# Patient Record
Sex: FEMALE | Race: WHITE | Employment: UNEMPLOYED | URBAN - METROPOLITAN AREA
[De-identification: names, ages, dates, MRNs, and addresses within clinical notes are randomized per-mention and may not be internally consistent; named-entity substitution may affect disease eponyms.]

---

## 2022-09-08 LAB — MAMMOGRAPHY, EXTERNAL: NORMAL

## 2024-04-23 ENCOUNTER — HOSPITAL ENCOUNTER (INPATIENT)
Facility: HOSPITAL | Age: 74
LOS: 2 days | Discharge: HOME | End: 2024-04-25
Attending: EMERGENCY MEDICINE | Admitting: STUDENT IN AN ORGANIZED HEALTH CARE EDUCATION/TRAINING PROGRAM
Payer: COMMERCIAL

## 2024-04-23 ENCOUNTER — APPOINTMENT (OUTPATIENT)
Facility: HOSPITAL | Age: 74
End: 2024-04-23
Attending: EMERGENCY MEDICINE
Payer: COMMERCIAL

## 2024-04-23 ENCOUNTER — APPOINTMENT (OUTPATIENT)
Facility: HOSPITAL | Age: 74
End: 2024-04-23
Payer: COMMERCIAL

## 2024-04-23 DIAGNOSIS — I82.4Y2 ACUTE DEEP VEIN THROMBOSIS (DVT) OF PROXIMAL VEIN OF LEFT LOWER EXTREMITY (HCC): ICD-10-CM

## 2024-04-23 DIAGNOSIS — I26.92 ACUTE SADDLE PULMONARY EMBOLISM, UNSPECIFIED WHETHER ACUTE COR PULMONALE PRESENT (HCC): Primary | ICD-10-CM

## 2024-04-23 PROBLEM — I26.02 ACUTE SADDLE PULMONARY EMBOLISM WITH ACUTE COR PULMONALE (HCC): Status: ACTIVE | Noted: 2024-04-23

## 2024-04-23 LAB
ABO + RH BLD: NORMAL
ACT BLD: 196 SEC (ref 74–125)
ALBUMIN SERPL-MCNC: 4 G/DL (ref 3.5–5)
ALBUMIN/GLOB SERPL: 0.9 (ref 1.1–2.2)
ALP SERPL-CCNC: 102 U/L (ref 45–117)
ALT SERPL-CCNC: ABNORMAL U/L (ref 12–78)
ANION GAP SERPL CALC-SCNC: 4 MMOL/L (ref 5–15)
APPEARANCE UR: CLEAR
APTT PPP: 22.6 SEC (ref 21.2–34.1)
AST SERPL W P-5'-P-CCNC: ABNORMAL U/L (ref 15–37)
BACTERIA URNS QL MICRO: NEGATIVE /HPF
BASOPHILS # BLD: 0 K/UL (ref 0–0.1)
BASOPHILS NFR BLD: 0 % (ref 0–1)
BILIRUB SERPL-MCNC: 0.4 MG/DL (ref 0.2–1)
BILIRUB UR QL: NEGATIVE
BLOOD GROUP ANTIBODIES SERPL: NEGATIVE
BNP SERPL-MCNC: 155 PG/ML
BUN SERPL-MCNC: 17 MG/DL (ref 6–20)
BUN/CREAT SERPL: 16 (ref 12–20)
CA-I BLD-MCNC: 9.3 MG/DL (ref 8.5–10.1)
CHLORIDE SERPL-SCNC: 102 MMOL/L (ref 97–108)
CO2 SERPL-SCNC: 26 MMOL/L (ref 21–32)
COLOR UR: ABNORMAL
CREAT SERPL-MCNC: 1.04 MG/DL (ref 0.55–1.02)
D DIMER PPP FEU-MCNC: 9.49 UG/ML(FEU)
DIFFERENTIAL METHOD BLD: ABNORMAL
ECHO AO ASC DIAM: 2.5 CM
ECHO AO ASCENDING AORTA INDEX: 1.53 CM/M2
ECHO AO ROOT DIAM: 2.3 CM
ECHO AO ROOT INDEX: 1.41 CM/M2
ECHO AV AREA PEAK VELOCITY: 1.3 CM2
ECHO AV AREA/BSA PEAK VELOCITY: 0.8 CM2/M2
ECHO AV PEAK GRADIENT: 13 MMHG
ECHO AV PEAK VELOCITY: 1.8 M/S
ECHO AV VELOCITY RATIO: 0.94
ECHO BSA: 1.68 M2
ECHO BSA: 1.68 M2
ECHO EST RA PRESSURE: 3 MMHG
ECHO IVC EXP: 1.8 CM
ECHO LA AREA 2C: 11.9 CM2
ECHO LA AREA 4C: 10.8 CM2
ECHO LA DIAMETER INDEX: 1.96 CM/M2
ECHO LA DIAMETER: 3.2 CM
ECHO LA MAJOR AXIS: 4.9 CM
ECHO LA MINOR AXIS: 5.2 CM
ECHO LA TO AORTIC ROOT RATIO: 1.39
ECHO LA VOL BP: 22 ML (ref 22–52)
ECHO LA VOL MOD A2C: 23 ML (ref 22–52)
ECHO LA VOL MOD A4C: 20 ML (ref 22–52)
ECHO LA VOL/BSA BIPLANE: 13 ML/M2 (ref 16–34)
ECHO LA VOLUME INDEX MOD A2C: 14 ML/M2 (ref 16–34)
ECHO LA VOLUME INDEX MOD A4C: 12 ML/M2 (ref 16–34)
ECHO LV E' LATERAL VELOCITY: 5 CM/S
ECHO LV E' SEPTAL VELOCITY: 9 CM/S
ECHO LV EDV A2C: 46 ML
ECHO LV EDV NDEX A2C: 28 ML/M2
ECHO LV EJECTION FRACTION A2C: 69 %
ECHO LV ESV A2C: 14 ML
ECHO LV ESV INDEX A2C: 9 ML/M2
ECHO LV FRACTIONAL SHORTENING: 40 % (ref 28–44)
ECHO LV INTERNAL DIMENSION DIASTOLE INDEX: 1.53 CM/M2
ECHO LV INTERNAL DIMENSION DIASTOLIC: 2.5 CM (ref 3.9–5.3)
ECHO LV INTERNAL DIMENSION SYSTOLIC INDEX: 0.92 CM/M2
ECHO LV INTERNAL DIMENSION SYSTOLIC: 1.5 CM
ECHO LV IVSD: 1.5 CM (ref 0.6–0.9)
ECHO LV MASS 2D: 104.6 G (ref 67–162)
ECHO LV MASS INDEX 2D: 64.2 G/M2 (ref 43–95)
ECHO LV POSTERIOR WALL DIASTOLIC: 1.2 CM (ref 0.6–0.9)
ECHO LV RELATIVE WALL THICKNESS RATIO: 0.96
ECHO LVOT AREA: 1.3 CM2
ECHO LVOT DIAM: 1.3 CM
ECHO LVOT PEAK GRADIENT: 12 MMHG
ECHO LVOT PEAK VELOCITY: 1.7 M/S
ECHO MV A VELOCITY: 0.49 M/S
ECHO MV E DECELERATION TIME (DT): 115 MS
ECHO MV E VELOCITY: 0.75 M/S
ECHO MV E/A RATIO: 1.53
ECHO MV E/E' LATERAL: 15
ECHO MV E/E' RATIO (AVERAGED): 11.67
ECHO PV ACCELERATION TIME (AT): 53 MS
ECHO PV MAX VELOCITY: 1 M/S
ECHO PV PEAK GRADIENT: 4 MMHG
ECHO RA AREA 4C: 12.2 CM2
ECHO RA END SYSTOLIC VOLUME APICAL 4 CHAMBER INDEX BSA: 17 ML/M2
ECHO RA VOLUME: 27 ML
ECHO RIGHT VENTRICULAR SYSTOLIC PRESSURE (RVSP): 32 MMHG
ECHO RV BASAL DIMENSION: 3.4 CM
ECHO TV REGURGITANT MAX VELOCITY: 2.71 M/S
ECHO TV REGURGITANT PEAK GRADIENT: 29 MMHG
EKG ATRIAL RATE: 114 BPM
EKG DIAGNOSIS: NORMAL
EKG P AXIS: 72 DEGREES
EKG P-R INTERVAL: 204 MS
EKG Q-T INTERVAL: 292 MS
EKG QRS DURATION: 74 MS
EKG QTC CALCULATION (BAZETT): 402 MS
EKG R AXIS: -35 DEGREES
EKG T AXIS: 87 DEGREES
EKG VENTRICULAR RATE: 114 BPM
EOSINOPHIL # BLD: 0.1 K/UL (ref 0–0.4)
EOSINOPHIL NFR BLD: 1 % (ref 0–7)
EPITH CASTS URNS QL MICRO: ABNORMAL /LPF
ERYTHROCYTE [DISTWIDTH] IN BLOOD BY AUTOMATED COUNT: 13 % (ref 11.5–14.5)
ERYTHROCYTE [DISTWIDTH] IN BLOOD BY AUTOMATED COUNT: 13.1 % (ref 11.5–14.5)
FLUAV AG NPH QL IA: NEGATIVE
FLUBV AG NOSE QL IA: NEGATIVE
GLOBULIN SER CALC-MCNC: 4.5 G/DL (ref 2–4)
GLUCOSE BLD STRIP.AUTO-MCNC: 166 MG/DL (ref 65–100)
GLUCOSE SERPL-MCNC: 186 MG/DL (ref 65–100)
GLUCOSE UR STRIP.AUTO-MCNC: 50 MG/DL
HCT VFR BLD AUTO: 42.8 % (ref 35–47)
HCT VFR BLD AUTO: 47.2 % (ref 35–47)
HGB BLD-MCNC: 14 G/DL (ref 11.5–16)
HGB BLD-MCNC: 15.3 G/DL (ref 11.5–16)
HGB UR QL STRIP: ABNORMAL
IMM GRANULOCYTES # BLD AUTO: 0.1 K/UL (ref 0–0.04)
IMM GRANULOCYTES NFR BLD AUTO: 1 % (ref 0–0.5)
INR PPP: 1.2 (ref 0.9–1.1)
KETONES UR QL STRIP.AUTO: NEGATIVE MG/DL
LACTATE BLD-SCNC: 2.83 MMOL/L (ref 0.4–2)
LACTATE BLD-SCNC: 4.59 MMOL/L (ref 0.4–2)
LACTATE SERPL-SCNC: 3.2 MMOL/L (ref 0.4–2)
LACTATE SERPL-SCNC: 3.5 MMOL/L (ref 0.4–2)
LEUKOCYTE ESTERASE UR QL STRIP.AUTO: ABNORMAL
LYMPHOCYTES # BLD: 2.1 K/UL (ref 0.8–3.5)
LYMPHOCYTES NFR BLD: 14 % (ref 12–49)
MCH RBC QN AUTO: 30.5 PG (ref 26–34)
MCH RBC QN AUTO: 30.7 PG (ref 26–34)
MCHC RBC AUTO-ENTMCNC: 32.4 G/DL (ref 30–36.5)
MCHC RBC AUTO-ENTMCNC: 32.7 G/DL (ref 30–36.5)
MCV RBC AUTO: 93.9 FL (ref 80–99)
MCV RBC AUTO: 94 FL (ref 80–99)
MONOCYTES # BLD: 1.1 K/UL (ref 0–1)
MONOCYTES NFR BLD: 7 % (ref 5–13)
MRSA DNA SPEC QL NAA+PROBE: DETECTED
MUCOUS THREADS URNS QL MICRO: NEGATIVE /LPF
NEUTS SEG # BLD: 11.7 K/UL (ref 1.8–8)
NEUTS SEG NFR BLD: 77 % (ref 32–75)
NITRITE UR QL STRIP.AUTO: NEGATIVE
NRBC # BLD: 0 K/UL (ref 0–0.01)
NRBC # BLD: 0 K/UL (ref 0–0.01)
NRBC BLD-RTO: 0 PER 100 WBC
NRBC BLD-RTO: 0 PER 100 WBC
PERFORMED BY:: ABNORMAL
PH UR STRIP: 5 (ref 5–8)
PLATELET # BLD AUTO: 160 K/UL (ref 150–400)
PLATELET # BLD AUTO: 164 K/UL (ref 150–400)
PMV BLD AUTO: 10.8 FL (ref 8.9–12.9)
PMV BLD AUTO: 11 FL (ref 8.9–12.9)
POTASSIUM SERPL-SCNC: 4.7 MMOL/L (ref 3.5–5.1)
POTASSIUM SERPL-SCNC: ABNORMAL MMOL/L (ref 3.5–5.1)
PROCALCITONIN SERPL-MCNC: <0.05 NG/ML
PROT SERPL-MCNC: 8.5 G/DL (ref 6.4–8.2)
PROT UR STRIP-MCNC: NEGATIVE MG/DL
PROTHROMBIN TIME: 15.4 SEC (ref 11.9–14.6)
RBC # BLD AUTO: 4.56 M/UL (ref 3.8–5.2)
RBC # BLD AUTO: 5.02 M/UL (ref 3.8–5.2)
RBC #/AREA URNS HPF: ABNORMAL /HPF (ref 0–5)
SARS-COV-2 RDRP RESP QL NAA+PROBE: NOT DETECTED
SODIUM SERPL-SCNC: 132 MMOL/L (ref 136–145)
SP GR UR REFRACTOMETRY: >1.03 (ref 1–1.03)
SPECIMEN EXP DATE BLD: NORMAL
THERAPEUTIC RANGE: NORMAL SEC (ref 82–109)
TROPONIN I SERPL HS-MCNC: 1264 NG/L (ref 0–51)
TROPONIN I SERPL HS-MCNC: 1304 NG/L (ref 0–51)
UFH PPP CHRO-ACNC: >1.1 IU/ML
UFH PPP CHRO-ACNC: >1.1 IU/ML
URINE CULTURE IF INDICATED: ABNORMAL
UROBILINOGEN UR QL STRIP.AUTO: 0.1 EU/DL (ref 0.1–1)
WBC # BLD AUTO: 11.3 K/UL (ref 3.6–11)
WBC # BLD AUTO: 15.1 K/UL (ref 3.6–11)
WBC URNS QL MICRO: ABNORMAL /HPF (ref 0–4)

## 2024-04-23 PROCEDURE — 71275 CT ANGIOGRAPHY CHEST: CPT

## 2024-04-23 PROCEDURE — 86900 BLOOD TYPING SEROLOGIC ABO: CPT

## 2024-04-23 PROCEDURE — 96374 THER/PROPH/DIAG INJ IV PUSH: CPT

## 2024-04-23 PROCEDURE — 85347 COAGULATION TIME ACTIVATED: CPT

## 2024-04-23 PROCEDURE — 6360000002 HC RX W HCPCS: Performed by: STUDENT IN AN ORGANIZED HEALTH CARE EDUCATION/TRAINING PROGRAM

## 2024-04-23 PROCEDURE — 99285 EMERGENCY DEPT VISIT HI MDM: CPT

## 2024-04-23 PROCEDURE — 71045 X-RAY EXAM CHEST 1 VIEW: CPT

## 2024-04-23 PROCEDURE — 2700000000 HC OXYGEN THERAPY PER DAY

## 2024-04-23 PROCEDURE — 6360000002 HC RX W HCPCS: Performed by: EMERGENCY MEDICINE

## 2024-04-23 PROCEDURE — 82962 GLUCOSE BLOOD TEST: CPT

## 2024-04-23 PROCEDURE — 6360000004 HC RX CONTRAST MEDICATION: Performed by: EMERGENCY MEDICINE

## 2024-04-23 PROCEDURE — 2000000000 HC ICU R&B

## 2024-04-23 PROCEDURE — 84484 ASSAY OF TROPONIN QUANT: CPT

## 2024-04-23 PROCEDURE — 02CR3ZZ EXTIRPATION OF MATTER FROM LEFT PULMONARY ARTERY, PERCUTANEOUS APPROACH: ICD-10-PCS | Performed by: STUDENT IN AN ORGANIZED HEALTH CARE EDUCATION/TRAINING PROGRAM

## 2024-04-23 PROCEDURE — 87635 SARS-COV-2 COVID-19 AMP PRB: CPT

## 2024-04-23 PROCEDURE — 2580000003 HC RX 258: Performed by: STUDENT IN AN ORGANIZED HEALTH CARE EDUCATION/TRAINING PROGRAM

## 2024-04-23 PROCEDURE — 84145 PROCALCITONIN (PCT): CPT

## 2024-04-23 PROCEDURE — 2580000003 HC RX 258: Performed by: EMERGENCY MEDICINE

## 2024-04-23 PROCEDURE — 85379 FIBRIN DEGRADATION QUANT: CPT

## 2024-04-23 PROCEDURE — 85025 COMPLETE CBC W/AUTO DIFF WBC: CPT

## 2024-04-23 PROCEDURE — 85730 THROMBOPLASTIN TIME PARTIAL: CPT

## 2024-04-23 PROCEDURE — 85610 PROTHROMBIN TIME: CPT

## 2024-04-23 PROCEDURE — 83880 ASSAY OF NATRIURETIC PEPTIDE: CPT

## 2024-04-23 PROCEDURE — 84132 ASSAY OF SERUM POTASSIUM: CPT

## 2024-04-23 PROCEDURE — 6360000004 HC RX CONTRAST MEDICATION: Performed by: STUDENT IN AN ORGANIZED HEALTH CARE EDUCATION/TRAINING PROGRAM

## 2024-04-23 PROCEDURE — 87040 BLOOD CULTURE FOR BACTERIA: CPT

## 2024-04-23 PROCEDURE — 94761 N-INVAS EAR/PLS OXIMETRY MLT: CPT

## 2024-04-23 PROCEDURE — 2720000010 IR GUIDED THROMB MECH VEIN

## 2024-04-23 PROCEDURE — 85520 HEPARIN ASSAY: CPT

## 2024-04-23 PROCEDURE — 86901 BLOOD TYPING SEROLOGIC RH(D): CPT

## 2024-04-23 PROCEDURE — 93005 ELECTROCARDIOGRAM TRACING: CPT | Performed by: EMERGENCY MEDICINE

## 2024-04-23 PROCEDURE — 81001 URINALYSIS AUTO W/SCOPE: CPT

## 2024-04-23 PROCEDURE — 96375 TX/PRO/DX INJ NEW DRUG ADDON: CPT

## 2024-04-23 PROCEDURE — 80053 COMPREHEN METABOLIC PANEL: CPT

## 2024-04-23 PROCEDURE — 85027 COMPLETE CBC AUTOMATED: CPT

## 2024-04-23 PROCEDURE — 02CQ3ZZ EXTIRPATION OF MATTER FROM RIGHT PULMONARY ARTERY, PERCUTANEOUS APPROACH: ICD-10-PCS | Performed by: STUDENT IN AN ORGANIZED HEALTH CARE EDUCATION/TRAINING PROGRAM

## 2024-04-23 PROCEDURE — 86850 RBC ANTIBODY SCREEN: CPT

## 2024-04-23 PROCEDURE — 83605 ASSAY OF LACTIC ACID: CPT

## 2024-04-23 PROCEDURE — 87804 INFLUENZA ASSAY W/OPTIC: CPT

## 2024-04-23 PROCEDURE — 93971 EXTREMITY STUDY: CPT

## 2024-04-23 PROCEDURE — 87641 MR-STAPH DNA AMP PROBE: CPT

## 2024-04-23 PROCEDURE — 93306 TTE W/DOPPLER COMPLETE: CPT

## 2024-04-23 RX ORDER — ONDANSETRON 2 MG/ML
INJECTION INTRAMUSCULAR; INTRAVENOUS PRN
Status: COMPLETED | OUTPATIENT
Start: 2024-04-23 | End: 2024-04-23

## 2024-04-23 RX ORDER — SODIUM CHLORIDE 0.9 % (FLUSH) 0.9 %
5-40 SYRINGE (ML) INJECTION PRN
Status: DISCONTINUED | OUTPATIENT
Start: 2024-04-23 | End: 2024-04-25 | Stop reason: HOSPADM

## 2024-04-23 RX ORDER — ONDANSETRON 2 MG/ML
4 INJECTION INTRAMUSCULAR; INTRAVENOUS EVERY 6 HOURS PRN
Status: DISCONTINUED | OUTPATIENT
Start: 2024-04-23 | End: 2024-04-25 | Stop reason: HOSPADM

## 2024-04-23 RX ORDER — POLYETHYLENE GLYCOL 3350 17 G/17G
17 POWDER, FOR SOLUTION ORAL DAILY PRN
Status: DISCONTINUED | OUTPATIENT
Start: 2024-04-23 | End: 2024-04-25 | Stop reason: HOSPADM

## 2024-04-23 RX ORDER — HEPARIN SODIUM 1000 [USP'U]/ML
80 INJECTION, SOLUTION INTRAVENOUS; SUBCUTANEOUS PRN
Status: DISCONTINUED | OUTPATIENT
Start: 2024-04-23 | End: 2024-04-25

## 2024-04-23 RX ORDER — HEPARIN SODIUM 1000 [USP'U]/ML
80 INJECTION, SOLUTION INTRAVENOUS; SUBCUTANEOUS ONCE
Status: COMPLETED | OUTPATIENT
Start: 2024-04-23 | End: 2024-04-23

## 2024-04-23 RX ORDER — SODIUM CHLORIDE 0.9 % (FLUSH) 0.9 %
5-40 SYRINGE (ML) INJECTION EVERY 12 HOURS SCHEDULED
Status: DISCONTINUED | OUTPATIENT
Start: 2024-04-23 | End: 2024-04-25 | Stop reason: HOSPADM

## 2024-04-23 RX ORDER — HEPARIN SODIUM 10000 [USP'U]/100ML
5-30 INJECTION, SOLUTION INTRAVENOUS CONTINUOUS
Status: DISCONTINUED | OUTPATIENT
Start: 2024-04-23 | End: 2024-04-25

## 2024-04-23 RX ORDER — HEPARIN SODIUM 1000 [USP'U]/ML
40 INJECTION, SOLUTION INTRAVENOUS; SUBCUTANEOUS PRN
Status: DISCONTINUED | OUTPATIENT
Start: 2024-04-23 | End: 2024-04-25

## 2024-04-23 RX ORDER — ONDANSETRON 4 MG/1
4 TABLET, ORALLY DISINTEGRATING ORAL EVERY 8 HOURS PRN
Status: DISCONTINUED | OUTPATIENT
Start: 2024-04-23 | End: 2024-04-25 | Stop reason: HOSPADM

## 2024-04-23 RX ORDER — SODIUM CHLORIDE 9 MG/ML
INJECTION, SOLUTION INTRAVENOUS PRN
Status: DISCONTINUED | OUTPATIENT
Start: 2024-04-23 | End: 2024-04-25 | Stop reason: HOSPADM

## 2024-04-23 RX ORDER — 0.9 % SODIUM CHLORIDE 0.9 %
30 INTRAVENOUS SOLUTION INTRAVENOUS ONCE
Status: COMPLETED | OUTPATIENT
Start: 2024-04-23 | End: 2024-04-23

## 2024-04-23 RX ORDER — FENTANYL CITRATE 50 UG/ML
INJECTION, SOLUTION INTRAMUSCULAR; INTRAVENOUS PRN
Status: COMPLETED | OUTPATIENT
Start: 2024-04-23 | End: 2024-04-23

## 2024-04-23 RX ORDER — ACETAMINOPHEN 325 MG/1
650 TABLET ORAL EVERY 6 HOURS PRN
Status: DISCONTINUED | OUTPATIENT
Start: 2024-04-23 | End: 2024-04-25 | Stop reason: HOSPADM

## 2024-04-23 RX ORDER — ACETAMINOPHEN 650 MG/1
650 SUPPOSITORY RECTAL EVERY 6 HOURS PRN
Status: DISCONTINUED | OUTPATIENT
Start: 2024-04-23 | End: 2024-04-25 | Stop reason: HOSPADM

## 2024-04-23 RX ORDER — ONDANSETRON 2 MG/ML
4 INJECTION INTRAMUSCULAR; INTRAVENOUS ONCE
Status: COMPLETED | OUTPATIENT
Start: 2024-04-23 | End: 2024-04-23

## 2024-04-23 RX ORDER — HEPARIN SODIUM 1000 [USP'U]/ML
INJECTION, SOLUTION INTRAVENOUS; SUBCUTANEOUS PRN
Status: COMPLETED | OUTPATIENT
Start: 2024-04-23 | End: 2024-04-23

## 2024-04-23 RX ADMIN — HEPARIN SODIUM 18 UNITS/KG/HR: 10000 INJECTION, SOLUTION INTRAVENOUS at 09:03

## 2024-04-23 RX ADMIN — SODIUM CHLORIDE, PRESERVATIVE FREE 10 ML: 5 INJECTION INTRAVENOUS at 14:28

## 2024-04-23 RX ADMIN — HEPARIN SODIUM 2000 UNITS: 1000 INJECTION, SOLUTION INTRAVENOUS; SUBCUTANEOUS at 12:43

## 2024-04-23 RX ADMIN — FENTANYL CITRATE 50 MCG: 50 INJECTION, SOLUTION INTRAMUSCULAR; INTRAVENOUS at 12:23

## 2024-04-23 RX ADMIN — HEPARIN SODIUM 5400 UNITS: 1000 INJECTION INTRAVENOUS; SUBCUTANEOUS at 09:03

## 2024-04-23 RX ADMIN — SODIUM CHLORIDE, PRESERVATIVE FREE 10 ML: 5 INJECTION INTRAVENOUS at 21:15

## 2024-04-23 RX ADMIN — ONDANSETRON 4 MG: 2 INJECTION INTRAMUSCULAR; INTRAVENOUS at 12:40

## 2024-04-23 RX ADMIN — IOPAMIDOL 100 ML: 755 INJECTION, SOLUTION INTRAVENOUS at 09:39

## 2024-04-23 RX ADMIN — ONDANSETRON 4 MG: 2 INJECTION INTRAMUSCULAR; INTRAVENOUS at 10:55

## 2024-04-23 RX ADMIN — SODIUM CHLORIDE 2040 ML: 9 INJECTION, SOLUTION INTRAVENOUS at 08:02

## 2024-04-23 RX ADMIN — IOPAMIDOL 150 ML: 755 INJECTION, SOLUTION INTRAVENOUS at 14:15

## 2024-04-23 RX ADMIN — CEFTRIAXONE SODIUM 1000 MG: 1 INJECTION, POWDER, FOR SOLUTION INTRAMUSCULAR; INTRAVENOUS at 08:04

## 2024-04-23 ASSESSMENT — LIFESTYLE VARIABLES: HOW OFTEN DO YOU HAVE A DRINK CONTAINING ALCOHOL: NEVER

## 2024-04-23 ASSESSMENT — PAIN - FUNCTIONAL ASSESSMENT: PAIN_FUNCTIONAL_ASSESSMENT: NONE - DENIES PAIN

## 2024-04-23 NOTE — CONSULTS
Cardiology Consult    NAME: Emmy Forde   :  1950   MRN:  [id number]     Date/Time:  2024 10:32 AM    Patient PCP: No primary care provider on file.  ________________________________________________________________________     Assessment/Plan:   Saddle pulmonary embolism, on heparin, being evaluated by interventional radiology.  Will obtain echo.    Troponin leak, type II MI, will repeat troponin.  Repeat EKG.                   []        High complexity decision making was performed        Subjective:   CHIEF COMPLAINT:     Shortness of breath, left leg pain  REASON FOR CONSULT:    PE/troponin leak  HISTORY OF PRESENT ILLNESS:     Emmy Forde is a 74 y.o. White (non-) female who presents with leftlegpain shortness of breath.  CT chest with saddle pulmonary embolus.  Left lower extremity is swollen and painful.        History reviewed. No pertinent past medical history.   History reviewed. No pertinent surgical history.  No Known Allergies   Meds:  See below  Social History     Tobacco Use    Smoking status: Never    Smokeless tobacco: Never   Substance Use Topics    Alcohol use: Not on file      History reviewed. No pertinent family history.    REVIEW OF SYSTEMS:     []         Unable to obtain  ROS due to ---   [x]         Total of 12 systems reviewed as follows:    Constitutional: negative fever, negative chills, negative weight loss  Eyes:   negative visual changes  ENT:   negative sore throat, tongue or lip swelling  Respiratory:  negative cough, negative dyspnea  Cards:               See HPI  GI:   negative for nausea, vomiting, diarrhea, and abdominal pain  Genitourinary: negative for frequency, dysuria  Integument:  negative for rash   Hematologic:  negative for easy bruising and gum/nose bleeding  Musculoskel: negative for myalgias,  back pain  Neurological:  negative for headaches, dizziness, vertigo, weakness  Behavl/Psych: negative for feelings of anxiety, depression     Pertinent  Absolute 11.7 (H) 1.8 - 8.0 K/UL    Lymphocytes Absolute 2.1 0.8 - 3.5 K/UL    Monocytes Absolute 1.1 (H) 0.0 - 1.0 K/UL    Eosinophils Absolute 0.1 0.0 - 0.4 K/UL    Basophils Absolute 0.0 0.0 - 0.1 K/UL    Immature Granulocytes Absolute 0.1 (H) 0.00 - 0.04 K/UL    Differential Type AUTOMATED     CMP    Collection Time: 04/23/24  7:30 AM   Result Value Ref Range    Sodium 132 (L) 136 - 145 mmol/L    Potassium Hemolyzed, Recollection Recommended 3.5 - 5.1 mmol/L    Chloride 102 97 - 108 mmol/L    CO2 26 21 - 32 mmol/L    Anion Gap 4 (L) 5 - 15 mmol/L    Glucose 186 (H) 65 - 100 mg/dL    BUN 17 6 - 20 mg/dL    Creatinine 1.04 (H) 0.55 - 1.02 mg/dL    Bun/Cre Ratio 16 12 - 20      Est, Glom Filt Rate 56 (L) >60 ml/min/1.73m2    Calcium 9.3 8.5 - 10.1 mg/dL    Total Bilirubin 0.4 0.2 - 1.0 mg/dL    AST Hemolyzed, Recollection Recommended 15 - 37 U/L    ALT Hemolyzed, Recollection Recommended 12 - 78 U/L    Alk Phosphatase 102 45 - 117 U/L    Total Protein 8.5 (H) 6.4 - 8.2 g/dL    Albumin 4.0 3.5 - 5.0 g/dL    Globulin 4.5 (H) 2.0 - 4.0 g/dL    Albumin/Globulin Ratio 0.9 (L) 1.1 - 2.2     Troponin    Collection Time: 04/23/24  7:30 AM   Result Value Ref Range    Troponin, High Sensitivity 1,264 (HH) 0 - 51 ng/L   Brain Natriuretic Peptide    Collection Time: 04/23/24  7:30 AM   Result Value Ref Range    NT Pro- (H) <125 pg/mL   POC Lactic Acid    Collection Time: 04/23/24  7:38 AM   Result Value Ref Range    POC Lactic Acid 4.59 (HH) 0.40 - 2.00 mmol/L    Performed by: Abilio BORGES, Rapid    Collection Time: 04/23/24  8:02 AM    Specimen: Nasopharyngeal   Result Value Ref Range    SARS-CoV-2, Rapid Not Detected Not Detected     Rapid influenza A/B antigens    Collection Time: 04/23/24  8:02 AM    Specimen: Nasal Washing   Result Value Ref Range    Influenza A Ag Negative Negative      Influenza B Ag Negative Negative     TYPE AND SCREEN    Collection Time: 04/23/24  8:02 AM   Result Value Ref

## 2024-04-23 NOTE — H&P
LA/AO Root Ratio 1.39     RA Volume Index A4C 17 mL/m2    Ao Root Index 1.41 cm/m2    Ascending Aorta Index 1.53 cm/m2    AV Velocity Ratio 0.94     DORA/BSA Peak Velocity 0.8 cm2/m2    Est. RA Pressure 3 mmHg    RVSP 32 mmHg   POCT activated clotting time    Collection Time: 04/23/24 12:36 PM   Result Value Ref Range    Activated Clotting Time 196 (H) 74 - 125 sec    Performed by: Ken Kohli    Lactic Acid    Collection Time: 04/23/24  2:25 PM   Result Value Ref Range    Lactic Acid, Plasma 3.5 (HH) 0.4 - 2.0 mmol/L         Echo (TTE) complete (PRN contrast/bubble/strain/3D)    Result Date: 4/23/2024    Left Ventricle: Hyperdynamic left ventricular systolic function with a visually estimated EF of 70 - 75%. Left ventricle size is normal. Moderately increased wall thickness. Normal wall motion.   Right Ventricle: Right ventricle is mildly dilated. Moderately reduced systolic function. Findings consistent with Alonzo's sign.   Tricuspid Valve: The estimated RVSP is 32 mmHg.   Image quality is fair.     CTA CHEST W WO CONTRAST PE Eval    Result Date: 4/23/2024  EXAM:  CTA CHEST W WO CONTRAST INDICATION: Shortness of breath COMPARISON: None. TECHNIQUE: Helical thin section chest CT following intravenous administration of nonionic contrast 100 mL of isovue 370 according to departmental PE protocol. Coronal and sagittal reformats were performed. 3D post processing was performed.  CT dose reduction was achieved through the use of a standardized protocol tailored for this examination and automatic exposure control for dose modulation. FINDINGS: This is a good quality study for the evaluation of pulmonary embolism to the first subsegmental arterial level. Pulmonary emboli are noted in the upper and lower lobes bilaterally. There is a thin saddle embolism extending from the right to left pulmonary artery segments. THYROID: No nodule. MEDIASTINUM: No mass or lymphadenopathy. SUKUMAR: No mass or lymphadenopathy. THORACIC

## 2024-04-23 NOTE — ED NOTES
Assumed care of pt at this time. Pt a+o. Denies any cp or sob. Complains of tiredness. Established new IV access 22 diffusix in rac. Blood sent to lab including anti xa redraw. Pt remains on cardiac monitor, pulse ox and bp cuff.   Dr. Griffiths made aware of lactic acid redraw at this time.

## 2024-04-23 NOTE — PROGRESS NOTES
TRANSFER - IN REPORT:    Verbal report received from Skye MALONE RN and Lin ANDERSON RN on Emmy Forde  being received from IR for routine progression of patient care      Report consisted of patient's Situation, Background, Assessment and   Recommendations(SBAR).     Information from the following report(s) Nurse Handoff Report, Intake/Output, MAR, and Recent Results was reviewed with the receiving nurse.    Opportunity for questions and clarification was provided.      Assessment completed upon patient's arrival to unit and care assumed.     1425- sending a lactic acid lab down to to the lab. Lactic acid lab is late, because the patient was in Interventional Radiology for a procedure.

## 2024-04-23 NOTE — ED NOTES
Xa greater than 1.10. per mar to hold infusion for 60 mins. Delay set on pump. Physician made aware.

## 2024-04-23 NOTE — OR NURSING
Pt to  post right femoral vein access for PE thrombectomy. Bedrest for 6 hours. HOB no greater than 30 degrees. IR staff to removed flowstasis from right groin 4/24/24

## 2024-04-23 NOTE — ED PROVIDER NOTES
[facility] EMERGENCY DEPT  EMERGENCY DEPARTMENT HISTORY AND PHYSICAL EXAM      Date: 4/23/2024  Patient Name: Emmy Forde  MRN: [id number]  Birthdate 1950  Date of evaluation: 4/23/2024  Provider: Dayna Griffiths MD   Note Started: 7:25 AM EDT 4/23/24    HISTORY OF PRESENT ILLNESS     Chief Complaint   Patient presents with    Shortness of Breath       History Provided By: Patient    HPI: Emmy Forde is a 74 y.o. female patient presents with some mild left leg discomfort.  Thought it was some sciatica from doing laundry yesterday.  Today while she was getting ready for work she felt pasty.  Noticed some shortness of breath shortness of breath with exertion never had any chest pain or coughing.  No trauma.  Laying down did not help with her symptoms.  She has no mass medical history only takes vitamins no cardiac history.    PAST MEDICAL HISTORY   Past Medical History:  History reviewed. No pertinent past medical history.    Past Surgical History:  History reviewed. No pertinent surgical history.    Family History:  History reviewed. No pertinent family history.    Social History:  Social History     Tobacco Use    Smoking status: Never    Smokeless tobacco: Never       Allergies:  No Known Allergies    PCP: No primary care provider on file.    Current Meds:   Current Facility-Administered Medications   Medication Dose Route Frequency Provider Last Rate Last Admin    heparin (porcine) injection 5,400 Units  80 Units/kg IntraVENous PRN Dayna Griffiths MD        heparin (porcine) injection 2,700 Units  40 Units/kg IntraVENous PRN Dayna Griffiths MD        heparin 25,000 units in dextrose 5% 250 mL (premix) infusion  5-30 Units/kg/hr IntraVENous Continuous Dayna Griffiths MD   Paused at 04/23/24 1104    sodium chloride flush 0.9 % injection 5-40 mL  5-40 mL IntraVENous 2 times per day Denny Zaragoza MD        sodium chloride flush 0.9 % injection 5-40 mL  5-40 mL IntraVENous PRN Denny Zaragoza MD

## 2024-04-23 NOTE — CARE COORDINATION
04/23/24 1441   Service Assessment   Patient Orientation Alert and Oriented   Cognition Alert   History Provided By Patient   Primary Caregiver Self   Support Systems Children;Family Members;Friends/Neighbors   Patient's Healthcare Decision Maker is: Legal Next of Kin   PCP Verified by CM No   Prior Functional Level Independent in ADLs/IADLs   Current Functional Level Independent in ADLs/IADLs   Can patient return to prior living arrangement Yes   Ability to make needs known: Good   Family able to assist with home care needs: Yes   Would you like for me to discuss the discharge plan with any other family members/significant others, and if so, who? Yes   Financial Resources None   Community Resources None   Social/Functional History   Lives With Son;Family   Occupation Full time employment   Services At/After Discharge   Mode of Transport at Discharge Other (see comment)   Confirm Follow Up Transport Family     CM met f/f with Pt, she confirmed that the information on the face sheet is correct. Pt stated that her son and D-I-L live with her.    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     No HH, no DME and independent with ADL    Send RX to [facility] [address]    Family will transport Pt home when D/C.    CM dispo: TBD    Advance Care Planning     General Advance Care Planning (ACP) Conversation    Date of Conversation: 4/23/2024  Conducted with:     Healthcare Decision Maker:    Primary Decision Maker: Jr Forde III - Child - [phone]  Click here to complete Healthcare Decision Makers including selection of the Healthcare Decision Maker Relationship (ie \"Primary\").   Today we     Content/Action Overview:    Reviewed DNR/DNI and patient elects Full Code (Attempt Resuscitation)

## 2024-04-23 NOTE — CONSULTS
INTERVENTIONAL RADIOLOGY  Consult Note      Patient:  Emmy Forde  :  1950  Age:  74 y.o.  MRN:  [id number]    Today's Date:  2024  Admission Date:  2024  Hospital Day:  0  Consult requested by:  Dyana Griffiths MD       CC / HPI   Emmy Forde is a 74 y.o. female who presented to ER with left leg pain and shortness of breath. Recent CT imaging demonstrates a saddle pulmonary embolism with suggestion of right heart strain.   IR consulted for possible thrombectomy.      PAST MEDICAL HISTORY  History reviewed. No pertinent past medical history.    PAST SURGICAL HISTORY  History reviewed. No pertinent surgical history.    SOCIAL HISTORY  Social History     Socioeconomic History    Marital status: Single     Spouse name: Not on file    Number of children: Not on file    Years of education: Not on file    Highest education level: Not on file   Occupational History    Not on file   Tobacco Use    Smoking status: Never    Smokeless tobacco: Never   Substance and Sexual Activity    Alcohol use: Not on file    Drug use: Not on file    Sexual activity: Not on file   Other Topics Concern    Not on file   Social History Narrative    Not on file     Social Determinants of Health     Financial Resource Strain: Not on file   Food Insecurity: No Food Insecurity (2024)    Hunger Vital Sign     Worried About Running Out of Food in the Last Year: Never true     Ran Out of Food in the Last Year: Never true   Transportation Needs: No Transportation Needs (2024)    PRAPARE - Transportation     Lack of Transportation (Medical): No     Lack of Transportation (Non-Medical): No   Physical Activity: Not on file   Stress: Not on file   Social Connections: Not on file   Intimate Partner Violence: Not on file   Housing Stability: Unknown (2024)    Housing Stability Vital Sign     Unable to Pay for Housing in the Last Year: No     Number of Places Lived in the Last Year: Not on file     Unstable Housing in  breath. Recent CT imaging demonstrates a saddle pulmonary embolism with suggestion of right heart strain.  Imaging reviewed by FAISAL TEMPLE and will plan for thrombectomy for acute PE today.     Procedure to be performed:  Mechanical thrombectomy for acute pulmonary embolism  Sedation: Conscious sedation  Mallampati Airway Assessment:  II (soft palate, uvula, fauces visible)  ASA Classification:  ASA 3 - Patient with moderate systemic disease with functional limitations  Post procedure plan:  observation per protocol  Informed consent:  indication, risks and alternatives of the planned procedure and sedation methods reviewed with the patient / family who agree to proceed  Code status:  Full    Case discussed with Dr. Edith Willett.    Thank you for asking us to participate in the care of this patient.      LAUREN GAN - NP  [facility]      CC:  Dayna Griffiths MD

## 2024-04-23 NOTE — PROCEDURES
Brief Postoperative Note    Emmy Forde  YOB: 1950  [id number]    Pre-operative Diagnosis: Intermediate-high risk PE    Post-operative Diagnosis: Same    Procedure: PE thrombectomy     Anesthesia: Local    Surgeons/Assistants: Edith Willett MD    Estimated Blood Loss: 100     Complications: None    Specimens: Was Not Obtained    Findings:     Pulmonary angiogram demonstrated a large burden of saddle/main/lobar PE.  Suction thrombectomy was performed with the Inari Triever 24. All of the central clot burden was removed. A small amount of segmental/subsegmental thrombus remains. This resulted in improvement of the mean PA pressure from 31 to 23 mmHg and improvement of oxygen saturations to 99% on room air.     Plan:  Bedrest until tomorrow morning.  A member of the IR team will remove the pursestring suture in the right groin tomorrow.         Electronically signed by Edith Willett MD on 4/23/2024 at 2:07 PM

## 2024-04-23 NOTE — ED TRIAGE NOTES
Patient states having shortness of breath, reports shortness of breath started when she crossed her leg. Pt reports had tightness in her lower left leg with swelling. Concerned she has a blood clot. Family reports she is pale and her breathing is abnormal for her. Pt reports breathing improved with rest and laying down.

## 2024-04-24 LAB
ANION GAP SERPL CALC-SCNC: 4 MMOL/L (ref 5–15)
BASOPHILS # BLD: 0 K/UL (ref 0–0.1)
BASOPHILS NFR BLD: 0 % (ref 0–1)
BUN SERPL-MCNC: 11 MG/DL (ref 6–20)
BUN/CREAT SERPL: 15 (ref 12–20)
CA-I BLD-MCNC: 8.3 MG/DL (ref 8.5–10.1)
CHLORIDE SERPL-SCNC: 111 MMOL/L (ref 97–108)
CO2 SERPL-SCNC: 25 MMOL/L (ref 21–32)
CREAT SERPL-MCNC: 0.74 MG/DL (ref 0.55–1.02)
DIFFERENTIAL METHOD BLD: ABNORMAL
EKG ATRIAL RATE: 88 BPM
EKG DIAGNOSIS: NORMAL
EKG P AXIS: 67 DEGREES
EKG P-R INTERVAL: 194 MS
EKG Q-T INTERVAL: 342 MS
EKG QRS DURATION: 88 MS
EKG QTC CALCULATION (BAZETT): 413 MS
EKG R AXIS: -35 DEGREES
EKG T AXIS: 102 DEGREES
EKG VENTRICULAR RATE: 88 BPM
EOSINOPHIL # BLD: 0.1 K/UL (ref 0–0.4)
EOSINOPHIL NFR BLD: 1 % (ref 0–7)
ERYTHROCYTE [DISTWIDTH] IN BLOOD BY AUTOMATED COUNT: 13.4 % (ref 11.5–14.5)
GLUCOSE BLD STRIP.AUTO-MCNC: 130 MG/DL (ref 65–100)
GLUCOSE BLD STRIP.AUTO-MCNC: 162 MG/DL (ref 65–100)
GLUCOSE SERPL-MCNC: 153 MG/DL (ref 65–100)
HCT VFR BLD AUTO: 34 % (ref 35–47)
HGB BLD-MCNC: 11.1 G/DL (ref 11.5–16)
IMM GRANULOCYTES # BLD AUTO: 0.1 K/UL (ref 0–0.04)
IMM GRANULOCYTES NFR BLD AUTO: 1 % (ref 0–0.5)
LYMPHOCYTES # BLD: 2.5 K/UL (ref 0.8–3.5)
LYMPHOCYTES NFR BLD: 23 % (ref 12–49)
MCH RBC QN AUTO: 30.2 PG (ref 26–34)
MCHC RBC AUTO-ENTMCNC: 32.6 G/DL (ref 30–36.5)
MCV RBC AUTO: 92.6 FL (ref 80–99)
MONOCYTES # BLD: 1 K/UL (ref 0–1)
MONOCYTES NFR BLD: 9 % (ref 5–13)
NEUTS SEG # BLD: 7.2 K/UL (ref 1.8–8)
NEUTS SEG NFR BLD: 66 % (ref 32–75)
NRBC # BLD: 0 K/UL (ref 0–0.01)
NRBC BLD-RTO: 0 PER 100 WBC
PERFORMED BY:: ABNORMAL
PERFORMED BY:: ABNORMAL
PLATELET # BLD AUTO: 179 K/UL (ref 150–400)
PMV BLD AUTO: 11 FL (ref 8.9–12.9)
POTASSIUM SERPL-SCNC: 4 MMOL/L (ref 3.5–5.1)
RBC # BLD AUTO: 3.67 M/UL (ref 3.8–5.2)
SODIUM SERPL-SCNC: 140 MMOL/L (ref 136–145)
TROPONIN I SERPL HS-MCNC: 2339 NG/L (ref 0–51)
UFH PPP CHRO-ACNC: 0.44 IU/ML
UFH PPP CHRO-ACNC: 0.51 IU/ML
UFH PPP CHRO-ACNC: 0.54 IU/ML
UFH PPP CHRO-ACNC: 0.71 IU/ML
WBC # BLD AUTO: 10.8 K/UL (ref 3.6–11)

## 2024-04-24 PROCEDURE — 93005 ELECTROCARDIOGRAM TRACING: CPT | Performed by: INTERNAL MEDICINE

## 2024-04-24 PROCEDURE — 2580000003 HC RX 258: Performed by: STUDENT IN AN ORGANIZED HEALTH CARE EDUCATION/TRAINING PROGRAM

## 2024-04-24 PROCEDURE — 85025 COMPLETE CBC W/AUTO DIFF WBC: CPT

## 2024-04-24 PROCEDURE — 80048 BASIC METABOLIC PNL TOTAL CA: CPT

## 2024-04-24 PROCEDURE — 82962 GLUCOSE BLOOD TEST: CPT

## 2024-04-24 PROCEDURE — 36415 COLL VENOUS BLD VENIPUNCTURE: CPT

## 2024-04-24 PROCEDURE — 84484 ASSAY OF TROPONIN QUANT: CPT

## 2024-04-24 PROCEDURE — 1100000000 HC RM PRIVATE

## 2024-04-24 PROCEDURE — 6370000000 HC RX 637 (ALT 250 FOR IP): Performed by: INTERNAL MEDICINE

## 2024-04-24 PROCEDURE — 85520 HEPARIN ASSAY: CPT

## 2024-04-24 PROCEDURE — 87040 BLOOD CULTURE FOR BACTERIA: CPT

## 2024-04-24 PROCEDURE — 6370000000 HC RX 637 (ALT 250 FOR IP): Performed by: STUDENT IN AN ORGANIZED HEALTH CARE EDUCATION/TRAINING PROGRAM

## 2024-04-24 PROCEDURE — 6360000002 HC RX W HCPCS: Performed by: EMERGENCY MEDICINE

## 2024-04-24 RX ADMIN — HEPARIN SODIUM 14 UNITS/KG/HR: 10000 INJECTION, SOLUTION INTRAVENOUS at 09:14

## 2024-04-24 RX ADMIN — ACETAMINOPHEN 650 MG: 325 TABLET ORAL at 19:28

## 2024-04-24 RX ADMIN — MUPIROCIN: 20 OINTMENT TOPICAL at 21:32

## 2024-04-24 RX ADMIN — MUPIROCIN: 20 OINTMENT TOPICAL at 11:06

## 2024-04-24 RX ADMIN — SODIUM CHLORIDE, PRESERVATIVE FREE 10 ML: 5 INJECTION INTRAVENOUS at 21:31

## 2024-04-24 RX ADMIN — SODIUM CHLORIDE, PRESERVATIVE FREE 10 ML: 5 INJECTION INTRAVENOUS at 07:43

## 2024-04-24 NOTE — PLAN OF CARE
Problem: Discharge Planning  Goal: Discharge to home or other facility with appropriate resources  Outcome: Progressing     Problem: Safety - Adult  Goal: Free from fall injury  4/24/2024 1317 by Linda Barnes, RN  Outcome: Progressing  4/24/2024 0759 by Lynn Sharma, RN  Outcome: Progressing     Problem: Chronic Conditions and Co-morbidities  Goal: Patient's chronic conditions and co-morbidity symptoms are monitored and maintained or improved  Outcome: Progressing     Problem: Neurosensory - Adult  Goal: Achieves stable or improved neurological status  Outcome: Progressing  Goal: Achieves maximal functionality and self care  Outcome: Progressing     Problem: Respiratory - Adult  Goal: Achieves optimal ventilation and oxygenation  Outcome: Progressing     Problem: Skin/Tissue Integrity - Adult  Goal: Skin integrity remains intact  Outcome: Progressing     Problem: Musculoskeletal - Adult  Goal: Return mobility to safest level of function  Outcome: Progressing  Goal: Return ADL status to a safe level of function  Outcome: Progressing     Problem: Infection - Adult  Goal: Absence of infection at discharge  Outcome: Progressing     Problem: Metabolic/Fluid and Electrolytes - Adult  Goal: Electrolytes maintained within normal limits  Outcome: Progressing     Problem: Skin/Tissue Integrity  Goal: Absence of new skin breakdown  Description: 1.  Monitor for areas of redness and/or skin breakdown  2.  Assess vascular access sites hourly  3.  Every 4-6 hours minimum:  Change oxygen saturation probe site  4.  Every 4-6 hours:  If on nasal continuous positive airway pressure, respiratory therapy assess nares and determine need for appliance change or resting period.  Outcome: Progressing

## 2024-04-24 NOTE — PROGRESS NOTES
Anti-Xa resulted at 0.71. Per order, Heparin drip decreased from 15 units/kg/hour to 14 units/kg/hour.     Anti-Xa due again at 0630.

## 2024-04-24 NOTE — PROGRESS NOTES
Hospitalist Progress Note               Daily Progress Note: 4/24/2024      Hospital Day: 2     Chief complaint:   Chief Complaint   Patient presents with    Shortness of Breath        Subjective:   Hospital course to date:    She is a 74-year-old lady with no significant past medical history was brought to the hospital after she started experiencing sudden onset shortness of breath today morning while she was getting ready to go to work.  Patient also experienced dizziness and lightheadedness.  Patient did not lose consciousness.  Patient denies chest pain.  Patient also complained of left lower extremity swelling.     In the ER, vital signs significant for heart rate in 110s.  Blood pressure 110/70.  Labs significant for sodium 132.  Troponin 1300.  Interventional radiology and cardiology consulted in the ER.  Echocardiogram was performed.  CTA chest showed saddle pulmonary embolism with heart strain.      Patient underwent thrombectomy with interventional radiology.  IR removed large volume of clot from both the right and left pulmonary arteries.  Which resulted in a reduction in the mean pulmonary artery pressures from 31 mmHg at 23 mmHg and reduction of oxygen requirements and SpO2 of 99% on room air.  Patient being admitted to ICU.    Patient received vascular duplex of lower extremity the results revealed:  Acute occlusive deep vein thrombosis in the left common femoral vein.  Acute occlusive deep vein thrombosis in the left femoral vein.  Acute occlusive deep vein thrombosis in the left popliteal vein.  Acute occlusive deep vein thrombosis in the left gastrocnemius vein.  Acute occlusive deep vein thrombosis in the left posterior tibial vein.  Acute occlusive deep vein thrombosis in the left peroneal vein.  --------  Patient is seen today for follow-up and presents.  Patient states that her breathing has significantly improved and that she has no chest pain.  Patient's SpO2 on physical exam was 94% on  Collection Time: 04/24/24  7:57 AM   Result Value Ref Range    POC Glucose 130 (H) 65 - 100 mg/dL    Performed by: Natalia Drake (Float Pool)        IR GUIDED THROMB MECH VEIN   Final Result   1.  Extensive bilateral pulmonary emboli.   2.  Suction thromboembolectomy using the Inari FlowTriever, which retrieved a   large volume of clot from both the right and left pulmonary arteries.   3.  This resulted in a reduction in the mean pulmonary artery pressures from 31   mmHg to 23 mm Hg and reduction of oxygen requirements to an SpO2 of 99% on room   air.            CTA CHEST W WO CONTRAST PE Eval   Final Result   Bilateral pulmonary emboli including a saddle embolism extending from the right   to left pulmonary artery segments. Suggestion of right heart strain. Lungs are   clear.      The findings were called to Dr. Griffiths on 4/23/2024 at 10:04 a.m. by myself.    789      Vascular duplex lower extremity venous left   Final Result      XR CHEST PORTABLE   Final Result   No acute cardiopulmonary process.             Discussion/MDM:     [] High (any 2)    A. Problems (any 1)  [x] Acute/Chronic Illness/injury posing threat to life or bodily function: Acute saddle pulmonary embolism  [] Severe exacerbation of chronic illness:    ---------------------------------------------------------------------  B. Risk of Treatment (any 1)   [] Drugs/treatments that require intensive monitoring for toxicity include:    [] IV ABX requiring serial renal monitoring for nephrotoxicity:     [] IV Narcotic analgesia for adverse drug reaction  [] Aggressive IV diuresis requiring serial monitoring for renal impairment and electrolyte derangements  [] Critical electrolyte abnormalities requiring IV replacement and close serial monitoring  [] SQ Insulin SS- monitoring serial FSBS for Hypoglycemic adverse drug reaction  [x] Other -heparin drip  [] Change in code status:    [] Decision to escalate care:    [] Major surgery/procedure with associated

## 2024-04-24 NOTE — PROGRESS NOTES
4 Eyes Skin Assessment     NAME:  Emmy Forde  YOB: 1950  MEDICAL RECORD NUMBER:  [id number]    The patient is being assessed for  Transfer to New Unit    I agree that at least one RN has performed a thorough Head to Toe Skin Assessment on the patient. ALL assessment sites listed below have been assessed.      Areas assessed by both nurses:    Head, Face, Ears, Shoulders, Back, Chest, Arms, Elbows, Hands, Sacrum. Buttock, Coccyx, Ischium, Legs. Feet and Heels, and Under Medical Devices         Does the Patient have a Wound? No noted wound(s). Patient has incision to right groin       Jony Prevention initiated by RN: No  Wound Care Orders initiated by RN: No    Pressure Injury (Stage 3,4, Unstageable, DTI, NWPT, and Complex wounds) if present, place Wound referral order by RN under : No    New Ostomies, if present place, Ostomy referral order under : No     Nurse 1 eSignature: Electronically signed by Linda Barnes RN on 4/24/24 at 1:51 PM EDT    **SHARE this note so that the co-signing nurse can place an eSignature**    Nurse 2 eSignature: Electronically signed by CARMEN SANTIAGO RN on 4/24/24 at 4:18 PM EDT

## 2024-04-24 NOTE — PROGRESS NOTES
TRANSFER - OUT REPORT:    Verbal report given to Linda RAMIREZ on Emmy Forde  being transferred to [facility]  for routine progression of patient care       Report consisted of patient's Situation, Background, Assessment and   Recommendations(SBAR).     Information from the following report(s) Nurse Handoff Report, Intake/Output, MAR, and Recent Results was reviewed with the receiving nurse.           Lines:   Peripheral IV 04/23/24 Left;Dorsal Forearm (Active)   Site Assessment Clean, dry & intact 04/24/24 1220   Line Status Flushed;Infusing 04/24/24 1220   Line Care Connections checked and tightened;Ports disinfected 04/24/24 1220   Phlebitis Assessment No symptoms 04/24/24 1220   Infiltration Assessment 0 04/24/24 1220   Alcohol Cap Used Yes 04/24/24 1220   Dressing Status Clean, dry & intact 04/24/24 1220   Dressing Type Transparent 04/24/24 1220       Peripheral IV 04/23/24 Distal;Right Forearm (Active)   Site Assessment Clean, dry & intact 04/24/24 1220   Line Status Flushed;Capped;Normal saline locked 04/24/24 1220   Line Care Cap changed;Connections checked and tightened;Ports disinfected 04/24/24 1220   Phlebitis Assessment No symptoms 04/24/24 1220   Infiltration Assessment 0 04/24/24 1220   Alcohol Cap Used Yes 04/24/24 1220   Dressing Status Clean, dry & intact 04/24/24 1220   Dressing Type Transparent 04/24/24 1220        Opportunity for questions and clarification was provided.      Patient transported with:  Monitor, Registered Nurse, and Tech

## 2024-04-24 NOTE — PROGRESS NOTES
Progress Note      4/24/2024 12:04 PM  NAME: Emmy Forde   MRN:  [id number]   Admit Diagnosis: Acute deep vein thrombosis (DVT) of proximal vein of left lower extremity (HCC) [I82.4Y2]  Acute saddle pulmonary embolism with acute cor pulmonale (HCC) [I26.02]  Acute saddle pulmonary embolism, unspecified whether acute cor pulmonale present (HCC) [I26.92]      Problem List:   Acute pulmonary embolism on heparin  Troponin leak likely related to RV strain from PE  Acute DVT  Status post thrombectomy     Assessment/Plan:   Echo reviewed and shows normal LVEF but evidence of mild to moderate right heart strain  Continue heparin  Plan for conservative/medical management of non-Q MI         []       High complexity decision making was performed in this patient at high risk for decompensation with multiple organ involvement.    Subjective:     Emmy Forde denies chest pain, dyspnea.  Discussed with RN events overnight.     Review of Systems:   Negative except for as noted above.    Objective:      Physical Exam:    Last 24hrs VS reviewed since prior progress note. Most recent are:    /64   Pulse 90   Temp 98.9 °F (37.2 °C) (Oral)   Resp 22   Ht 1.499 m (4' 11\")   Wt 68 kg (150 lb)   SpO2 93%   BMI 30.30 kg/m²     Intake/Output Summary (Last 24 hours) at 4/24/2024 1204  Last data filed at 4/24/2024 0916  Gross per 24 hour   Intake 502.62 ml   Output 1500 ml   Net -997.38 ml        General Appearance: Alert; no acute distress.  Ears/Nose/Mouth/Throat: moist mucous membranes  Neck: Supple.  Chest: Lungs clear to auscultation bilaterally.  Cardiovascular: Regular rate and rhythm, S1S2 normal  Abdomen: Soft, non-tender, bowel sounds are active.  Extremities: No edema bilaterally.  Skin: Warm and dry.      PMH/SH reviewed - no change compared to H&P    Telemetry: Sinus rhythm    EKG: Sinus rhythm, no ischemic change    No valid procedures specified.    No results found for this or any previous visit.    []   No new EKG for review    Lab Data Personally Reviewed:    Recent Labs     04/23/24  0855 04/24/24  0615   WBC 11.3* 10.8   HGB 14.0 11.1*   HCT 42.8 34.0*    179     Recent Labs     04/23/24  0802   INR 1.2*   APTT 22.6      Recent Labs     04/23/24  0730 04/23/24  0855 04/24/24  0615   *  --  140   K Hemolyzed, Recollection Recommended 4.7 4.0     --  111*   CO2 26  --  25   BUN 17  --  11     No results for input(s): \"CPK\" in the last 72 hours.    Invalid input(s): \"CPKMB\", \"CKNDX\", \"TROIQ\"  No results found for: \"CHOL\", \"CHOLX\", \"CHLST\", \"CHOLV\", \"HDL\", \"HDLC\", \"LDL\", \"LDLC\", \"TGLX\", \"TRIGL\"    Recent Labs     04/23/24  0730   GLOB 4.5*     No results for input(s): \"PH\", \"PCO2\", \"PO2\" in the last 72 hours.    Medications Personally Reviewed:    Current Facility-Administered Medications   Medication Dose Route Frequency    mupirocin (BACTROBAN) 2 % ointment   Topical BID    heparin (porcine) injection 5,400 Units  80 Units/kg IntraVENous PRN    heparin (porcine) injection 2,700 Units  40 Units/kg IntraVENous PRN    heparin 25,000 units in dextrose 5% 250 mL (premix) infusion  5-30 Units/kg/hr IntraVENous Continuous    sodium chloride flush 0.9 % injection 5-40 mL  5-40 mL IntraVENous 2 times per day    sodium chloride flush 0.9 % injection 5-40 mL  5-40 mL IntraVENous PRN    0.9 % sodium chloride infusion   IntraVENous PRN    ondansetron (ZOFRAN-ODT) disintegrating tablet 4 mg  4 mg Oral Q8H PRN    Or    ondansetron (ZOFRAN) injection 4 mg  4 mg IntraVENous Q6H PRN    polyethylene glycol (GLYCOLAX) packet 17 g  17 g Oral Daily PRN    acetaminophen (TYLENOL) tablet 650 mg  650 mg Oral Q6H PRN    Or    acetaminophen (TYLENOL) suppository 650 mg  650 mg Rectal Q6H PRN         Miladis Cardenas MD

## 2024-04-24 NOTE — PROGRESS NOTES
Anti-Xa sent at 2252, called lab to discuss results as blood work hasn't resulted. Was told that the machines are under maintenance (QC) and will be doing this maintenance for about another 30 minutes. Lab staff states that blood work will be processed and resulted as soon as the machines are complete.

## 2024-04-24 NOTE — CARE COORDINATION
0815: Chart reviewed.    Per notes; patient on hep gtt followed via cardiology.    Current Dispo: Home, no needs.    CM will continue to follow patient and recs of medical team.

## 2024-04-24 NOTE — PROGRESS NOTES
Hospitalist Progress Note               Daily Progress Note: 4/24/2024      Hospital Day: 2     Chief complaint:   Chief Complaint   Patient presents with    Shortness of Breath        Subjective:   Hospital course to date:    She is a 74-year-old lady with no significant past medical history was brought to the hospital after she started experiencing sudden onset shortness of breath today morning while she was getting ready to go to work.  Patient also experienced dizziness and lightheadedness.  Patient did not lose consciousness.  Patient denies chest pain.  Patient also complained of left lower extremity swelling.     In the ER, vital signs significant for heart rate in 110s.  Blood pressure 110/70.  Labs significant for sodium 132.  Troponin 1300.  Interventional radiology and cardiology consulted in the ER.  Echocardiogram was performed.  CTA chest showed saddle pulmonary embolism with heart strain.      Patient underwent thrombectomy with interventional radiology.  IR removed large volume of clot from both the right and left pulmonary arteries.  Which resulted in a reduction in the mean pulmonary artery pressures from 31 mmHg at 23 mmHg and reduction of oxygen requirements and SpO2 of 99% on room air.  Patient being admitted to ICU.    Patient received vascular duplex of lower extremity the results revealed:  Acute occlusive deep vein thrombosis in the left common femoral vein.  Acute occlusive deep vein thrombosis in the left femoral vein.  Acute occlusive deep vein thrombosis in the left popliteal vein.  Acute occlusive deep vein thrombosis in the left gastrocnemius vein.  Acute occlusive deep vein thrombosis in the left posterior tibial vein.  Acute occlusive deep vein thrombosis in the left peroneal vein.  --------  Patient is seen today for follow-up and presents.  Patient states that her breathing has significantly improved and that she has no chest pain.  Patient's SpO2 on physical exam was 94% on  life or bodily function: Acute saddle pulmonary embolism  [] Severe exacerbation of chronic illness:    ---------------------------------------------------------------------  B. Risk of Treatment (any 1)   [] Drugs/treatments that require intensive monitoring for toxicity include:    [] IV ABX requiring serial renal monitoring for nephrotoxicity:     [] IV Narcotic analgesia for adverse drug reaction  [] Aggressive IV diuresis requiring serial monitoring for renal impairment and electrolyte derangements  [] Critical electrolyte abnormalities requiring IV replacement and close serial monitoring  [] SQ Insulin SS- monitoring serial FSBS for Hypoglycemic adverse drug reaction  [x] Other -heparin drip  [] Change in code status:    [] Decision to escalate care:    [] Major surgery/procedure with associated risk factors:    ----------------------------------------------------------------------  C. Data (any 2)  [] Discussed current management and discharge planning options with Case Management.  [] Discussed management of the case with:    [] Telemetry personally reviewed and interpreted as documented above    [] Imaging personally reviewed and interpreted, includes:    [] Data Review (any 3)  [x] All available Consultant notes from yesterday/today were reviewed  [x] All current labs were reviewed and interpreted for clinical significance   [x] Appropriate follow-up labs were ordered  [] Collateral history obtained from:               Assessment:    Saddle pulmonary embolism   -Patient presented to the ED yesterday with complaint of shortness of breath   -CTA of chest revealed acute saddle pulmonary emboli with extensive coagulopathic both sides of the lung   -Vascular duplex of lower extremity revealed occlusive deep vein thrombosis in the left common femoral vein, left femoral vein, left popliteal vein, left gastrocnemius vein, left posterior tibial vein, and the left peroneal vein-most likely etiology of saddle  embolism   -Mechanical thrombectomy was performed and removed significant clot debris   -Patient's SpO2 improved to 99% on room air   -Latest SpO2 is 94 % on room air     Sinus tachycardia   -Has resolved his last heart rate is 91   -Most likely secondary to saddle pulmonary embolism    Hyponatremia   -Mild patient has been encourage oral intake    NSTEMI type II   -Latest troponin is 2300   -Likely secondary to saddle pulmonary emboli    MRSA infection   -On 4/23 MRSA by PCR was detected via swab and nares   -Procalcitonin was less than 0.05 yesterday   -White blood cell count 10.8   -Urinalysis reveals leukocyte esterase    RANCHO   -Large blood found in urine   -Leukocyte esterase is large and urine   -Creatinine is normal   -Likely secondary to pulmonary emboli         Plan:    Transfer patient to medical floor out of ICU today  Order Bactroban for MRSA in the nares  Continue monitoring heparin anti-Xa  Follow IR        Code status: Full    Social determinants of health: none      Estimated discharge date//time frame/disposition: 24 hours    Barriers to discharge:           Scotty Melchor

## 2024-04-25 VITALS
DIASTOLIC BLOOD PRESSURE: 61 MMHG | BODY MASS INDEX: 30.24 KG/M2 | HEIGHT: 59 IN | SYSTOLIC BLOOD PRESSURE: 111 MMHG | RESPIRATION RATE: 17 BRPM | HEART RATE: 67 BPM | TEMPERATURE: 98.8 F | OXYGEN SATURATION: 94 % | WEIGHT: 150 LBS

## 2024-04-25 LAB
ANION GAP SERPL CALC-SCNC: 0 MMOL/L (ref 5–15)
BASOPHILS # BLD: 0 K/UL (ref 0–0.1)
BASOPHILS NFR BLD: 0 % (ref 0–1)
BUN SERPL-MCNC: 10 MG/DL (ref 6–20)
BUN/CREAT SERPL: 13 (ref 12–20)
CA-I BLD-MCNC: 8.7 MG/DL (ref 8.5–10.1)
CHLORIDE SERPL-SCNC: 109 MMOL/L (ref 97–108)
CO2 SERPL-SCNC: 30 MMOL/L (ref 21–32)
CREAT SERPL-MCNC: 0.79 MG/DL (ref 0.55–1.02)
DIFFERENTIAL METHOD BLD: NORMAL
EOSINOPHIL # BLD: 0.2 K/UL (ref 0–0.4)
EOSINOPHIL NFR BLD: 2 % (ref 0–7)
ERYTHROCYTE [DISTWIDTH] IN BLOOD BY AUTOMATED COUNT: 13.2 % (ref 11.5–14.5)
GLUCOSE SERPL-MCNC: 137 MG/DL (ref 65–100)
HCT VFR BLD AUTO: 36 % (ref 35–47)
HGB BLD-MCNC: 11.8 G/DL (ref 11.5–16)
IMM GRANULOCYTES # BLD AUTO: 0 K/UL (ref 0–0.04)
IMM GRANULOCYTES NFR BLD AUTO: 0 % (ref 0–0.5)
LYMPHOCYTES # BLD: 3 K/UL (ref 0.8–3.5)
LYMPHOCYTES NFR BLD: 31 % (ref 12–49)
MCH RBC QN AUTO: 30.6 PG (ref 26–34)
MCHC RBC AUTO-ENTMCNC: 32.8 G/DL (ref 30–36.5)
MCV RBC AUTO: 93.5 FL (ref 80–99)
MONOCYTES # BLD: 1 K/UL (ref 0–1)
MONOCYTES NFR BLD: 11 % (ref 5–13)
NEUTS SEG # BLD: 5.2 K/UL (ref 1.8–8)
NEUTS SEG NFR BLD: 56 % (ref 32–75)
NRBC # BLD: 0 K/UL (ref 0–0.01)
NRBC BLD-RTO: 0 PER 100 WBC
PLATELET # BLD AUTO: 186 K/UL (ref 150–400)
PMV BLD AUTO: 10.5 FL (ref 8.9–12.9)
POTASSIUM SERPL-SCNC: 4.3 MMOL/L (ref 3.5–5.1)
RBC # BLD AUTO: 3.85 M/UL (ref 3.8–5.2)
SODIUM SERPL-SCNC: 139 MMOL/L (ref 136–145)
UFH PPP CHRO-ACNC: 0.39 IU/ML
WBC # BLD AUTO: 9.5 K/UL (ref 3.6–11)

## 2024-04-25 PROCEDURE — 2580000003 HC RX 258: Performed by: STUDENT IN AN ORGANIZED HEALTH CARE EDUCATION/TRAINING PROGRAM

## 2024-04-25 PROCEDURE — 85025 COMPLETE CBC W/AUTO DIFF WBC: CPT

## 2024-04-25 PROCEDURE — 85520 HEPARIN ASSAY: CPT

## 2024-04-25 PROCEDURE — 80048 BASIC METABOLIC PNL TOTAL CA: CPT

## 2024-04-25 PROCEDURE — 36415 COLL VENOUS BLD VENIPUNCTURE: CPT

## 2024-04-25 PROCEDURE — 6370000000 HC RX 637 (ALT 250 FOR IP): Performed by: INTERNAL MEDICINE

## 2024-04-25 RX ADMIN — MUPIROCIN: 20 OINTMENT TOPICAL at 09:55

## 2024-04-25 RX ADMIN — SODIUM CHLORIDE, PRESERVATIVE FREE 10 ML: 5 INJECTION INTRAVENOUS at 09:56

## 2024-04-25 RX ADMIN — APIXABAN 10 MG: 5 TABLET, FILM COATED ORAL at 11:08

## 2024-04-25 NOTE — PLAN OF CARE
Problem: Discharge Planning  Goal: Discharge to home or other facility with appropriate resources  4/25/2024 1211 by Mag Pacheco RN  Outcome: Adequate for Discharge  4/25/2024 1210 by Mag Pacheco RN  Outcome: Adequate for Discharge  4/24/2024 2351 by Jacquie Vega RN  Outcome: Progressing     Problem: Safety - Adult  Goal: Free from fall injury  4/25/2024 1211 by Mag Pacheco RN  Outcome: Adequate for Discharge  4/25/2024 1210 by Mag Pacheco RN  Outcome: Adequate for Discharge  4/24/2024 2351 by Jacquie Vega RN  Outcome: Progressing     Problem: Chronic Conditions and Co-morbidities  Goal: Patient's chronic conditions and co-morbidity symptoms are monitored and maintained or improved  4/25/2024 1211 by Mag Pacheco RN  Outcome: Adequate for Discharge  4/25/2024 1210 by Mag Pacheco RN  Outcome: Adequate for Discharge  4/24/2024 2351 by Jacquie Vega RN  Outcome: Progressing     Problem: Neurosensory - Adult  Goal: Achieves stable or improved neurological status  4/25/2024 1211 by Mag Pacheco RN  Outcome: Adequate for Discharge  4/25/2024 1210 by Mag Pacheco RN  Outcome: Adequate for Discharge  4/24/2024 2351 by Jacquie Vega RN  Outcome: Progressing  Goal: Achieves maximal functionality and self care  4/25/2024 1211 by Mag Pacheco RN  Outcome: Adequate for Discharge  4/25/2024 1210 by Mag Pacheco RN  Outcome: Adequate for Discharge  4/24/2024 2351 by Jacquie Vega RN  Outcome: Progressing     Problem: Respiratory - Adult  Goal: Achieves optimal ventilation and oxygenation  4/25/2024 1211 by Mag Pacheco RN  Outcome: Adequate for Discharge  4/25/2024 1210 by Mag Pacheco RN  Outcome: Adequate for Discharge  4/24/2024 2351 by Jacquie Vega RN  Outcome: Progressing     Problem: Skin/Tissue Integrity - Adult  Goal: Skin integrity remains intact  4/25/2024 1211 by Mag Pacheco RN  Outcome: Adequate for  Discharge  4/25/2024 1210 by Mag Pacheco RN  Outcome: Adequate for Discharge  4/24/2024 2351 by Jacquie Vega RN  Outcome: Progressing     Problem: Musculoskeletal - Adult  Goal: Return mobility to safest level of function  4/25/2024 1211 by Mag Pacheco RN  Outcome: Adequate for Discharge  4/25/2024 1210 by Mag Pacheco RN  Outcome: Adequate for Discharge  4/24/2024 2351 by Jacquie Vega RN  Outcome: Progressing  Goal: Return ADL status to a safe level of function  4/25/2024 1211 by Mag Pacheco RN  Outcome: Adequate for Discharge  4/25/2024 1210 by Mag Pacheco RN  Outcome: Adequate for Discharge  4/24/2024 2351 by Jacquie Vega RN  Outcome: Progressing     Problem: Infection - Adult  Goal: Absence of infection at discharge  4/25/2024 1211 by Mag Pacheco RN  Outcome: Adequate for Discharge  4/25/2024 1210 by Mag Pacheco RN  Outcome: Adequate for Discharge  4/24/2024 2351 by Jacquie Vega RN  Outcome: Progressing     Problem: Metabolic/Fluid and Electrolytes - Adult  Goal: Electrolytes maintained within normal limits  4/25/2024 1211 by Mag Pacheco RN  Outcome: Adequate for Discharge  4/25/2024 1210 by Mag Pacheco RN  Outcome: Adequate for Discharge  4/24/2024 2351 by Jacquie Vega RN  Outcome: Progressing     Problem: Skin/Tissue Integrity  Goal: Absence of new skin breakdown  Description: 1.  Monitor for areas of redness and/or skin breakdown  2.  Assess vascular access sites hourly  3.  Every 4-6 hours minimum:  Change oxygen saturation probe site  4.  Every 4-6 hours:  If on nasal continuous positive airway pressure, respiratory therapy assess nares and determine need for appliance change or resting period.  4/25/2024 1211 by Mag Pacheco RN  Outcome: Adequate for Discharge  4/25/2024 1210 by Mag Pacheco RN  Outcome: Adequate for Discharge  4/24/2024 2351 by Jacquie Vega RN  Outcome: Progressing

## 2024-04-25 NOTE — PROGRESS NOTES
Discharge paperwork complete. Print and give to patient when patient is ready to leave. Primary nurse aware.

## 2024-04-25 NOTE — PLAN OF CARE
Problem: Discharge Planning  Goal: Discharge to home or other facility with appropriate resources  4/24/2024 2351 by Jacquie Vega RN  Outcome: Progressing  4/24/2024 1317 by Linda Barnes RN  Outcome: Progressing     Problem: Safety - Adult  Goal: Free from fall injury  4/24/2024 2351 by Jacquie Vega RN  Outcome: Progressing  4/24/2024 1317 by Linda Barnes RN  Outcome: Progressing     Problem: Chronic Conditions and Co-morbidities  Goal: Patient's chronic conditions and co-morbidity symptoms are monitored and maintained or improved  4/24/2024 2351 by Jacquie Vega RN  Outcome: Progressing  4/24/2024 1317 by Linda Barnes RN  Outcome: Progressing     Problem: Neurosensory - Adult  Goal: Achieves stable or improved neurological status  4/24/2024 2351 by Jacquie Vega RN  Outcome: Progressing  4/24/2024 1317 by Linda Barnes RN  Outcome: Progressing  Goal: Achieves maximal functionality and self care  4/24/2024 2351 by Jacquie Vega RN  Outcome: Progressing  4/24/2024 1317 by Linda Barnes RN  Outcome: Progressing     Problem: Respiratory - Adult  Goal: Achieves optimal ventilation and oxygenation  4/24/2024 2351 by Jacquie Vega RN  Outcome: Progressing  4/24/2024 1317 by Linda Barnes RN  Outcome: Progressing     Problem: Skin/Tissue Integrity - Adult  Goal: Skin integrity remains intact  4/24/2024 2351 by Jacquie Vega RN  Outcome: Progressing  4/24/2024 1317 by Linda Barnes RN  Outcome: Progressing     Problem: Musculoskeletal - Adult  Goal: Return mobility to safest level of function  4/24/2024 2351 by Jacquie Vega RN  Outcome: Progressing  4/24/2024 1317 by Linda Barnes RN  Outcome: Progressing  Goal: Return ADL status to a safe level of function  4/24/2024 2351 by Jacquie Vega RN  Outcome: Progressing  4/24/2024 1317 by Linda Barnes RN  Outcome: Progressing     Problem: Infection - Adult  Goal: Absence of infection at discharge  4/24/2024 2351 by Jacquie Vega  RN  Outcome: Progressing  4/24/2024 1317 by Linda Barnes RN  Outcome: Progressing     Problem: Metabolic/Fluid and Electrolytes - Adult  Goal: Electrolytes maintained within normal limits  4/24/2024 2351 by Jacquie Vega RN  Outcome: Progressing  4/24/2024 1317 by Linda Barnes RN  Outcome: Progressing     Problem: Skin/Tissue Integrity  Goal: Absence of new skin breakdown  Description: 1.  Monitor for areas of redness and/or skin breakdown  2.  Assess vascular access sites hourly  3.  Every 4-6 hours minimum:  Change oxygen saturation probe site  4.  Every 4-6 hours:  If on nasal continuous positive airway pressure, respiratory therapy assess nares and determine need for appliance change or resting period.  4/24/2024 2351 by Jacquie Vega RN  Outcome: Progressing  4/24/2024 1317 by Linda Barnes RN  Outcome: Progressing

## 2024-04-25 NOTE — PROGRESS NOTES
To bedside for removal of right groin flowstasis. Dressing removed, flowstasis removed and suture removed with no signs of bleeding. Right groin cleaned with chlorhexadine. Gauze and tegaderm dressing applied. Verbal report to primary nurse.

## 2024-04-25 NOTE — DISCHARGE SUMMARY
Physician Discharge Summary     Patient ID:    Emmy Forde  [id number]  74 y.o.  1950    Admit date: 4/23/2024    Discharge date : 4/25/2024      Final Diagnoses:   Acute deep vein thrombosis (DVT) of proximal vein of left lower extremity (HCC) [I82.4Y2]  Acute saddle pulmonary embolism with acute cor pulmonale (HCC) [I26.02]  Acute saddle pulmonary embolism, unspecified whether acute cor pulmonale present (HCC) [I26.92]      Reason for Hospitalization:      She is a 74-year-old lady with no significant past medical history was brought to the hospital after she started experiencing sudden onset shortness of breath today morning while she was getting ready to go to work.  Patient also experienced dizziness and lightheadedness.  Patient did not lose consciousness.  Patient denies chest pain.  Patient also complained of left lower extremity swelling.     In the ER, vital signs significant for heart rate in 110s.  Blood pressure 110/70.  Labs significant for sodium 132.  Troponin 1300.  Interventional radiology and cardiology consulted in the ER.  Echocardiogram was performed.  CTA chest showed saddle pulmonary embolism with heart strain.        Hospital Course:     On 4/23 patient underwent thrombectomy with interventional radiology.  IR removed large volume of clot from both the right and left pulmonary arteries.  Which resulted in a reduction in the mean pulmonary artery pressures from 31 mmHg at 23 mmHg and reduction of oxygen requirements and SpO2 of 99% on room air.  Patient being admitted to ICU.     Patient received vascular duplex of lower extremity the results revealed:  Acute occlusive deep vein thrombosis in the left common femoral vein.  Acute occlusive deep vein thrombosis in the left femoral vein.  Acute occlusive deep vein thrombosis in the left popliteal vein.  Acute occlusive deep vein thrombosis in the left gastrocnemius vein.  Acute occlusive deep vein thrombosis in the left

## 2024-04-25 NOTE — DISCHARGE SUMMARY
Physician Discharge Summary     Patient ID:    Emmy Forde  [id number]  74 y.o.  1950    Admit date: 4/23/2024    Discharge date : 4/25/2024      Final Diagnoses:   Acute deep vein thrombosis (DVT) of proximal vein of left lower extremity (HCC) [I82.4Y2]  Acute saddle pulmonary embolism with acute cor pulmonale (HCC) [I26.02]  Acute saddle pulmonary embolism, unspecified whether acute cor pulmonale present (HCC) [I26.92]  Extensive DVT left lower extremity  Status post pulmonary artery thrombectomy bilateral    Reason for Hospitalization:      She is a 74-year-old lady with no significant past medical history was brought to the hospital after she started experiencing sudden onset shortness of breath today morning while she was getting ready to go to work.  Patient also experienced dizziness and lightheadedness.  Patient did not lose consciousness.  Patient denies chest pain.  Patient also complained of left lower extremity swelling.     In the ER, vital signs significant for heart rate in 110s.  Blood pressure 110/70.  Labs significant for sodium 132.  Troponin 1300.  Interventional radiology and cardiology consulted in the ER.  Echocardiogram was performed.  CTA chest showed saddle pulmonary embolism with heart strain.        Hospital Course:   Patient was admitted to the ICU, started on IV heparin    On 4/23 patient underwent thrombectomy with interventional radiology.  IR removed large volume of clot from both the right and left pulmonary arteries.  Which resulted in a reduction in the mean pulmonary artery pressures from 31 mmHg at 23 mmHg and reduction of oxygen requirements and SpO2 of 99% on room air.  Patient being admitted to ICU.     Patient received vascular duplex of lower extremity the results revealed:  Acute occlusive deep vein thrombosis in the left common femoral vein.  Acute occlusive deep vein thrombosis in the left femoral vein.  Acute occlusive deep vein thrombosis in  [address]  [address]  [facility] [address]  [phone]                   Diet:  regular diet    Disposition:  Home.    Advanced Directive:   FULL    DNR      Discharge Exam:  General:  Alert, cooperative, no distress, appears stated age.   Lungs:   Clear to auscultation bilaterally.   Chest wall:  No tenderness or deformity.   Heart:  Regular rate and rhythm, S1, S2 normal, no murmur, click, rub or gallop.   Abdomen:   Soft, non-tender. Bowel sounds normal. No masses,  No organomegaly.   Extremities: Extremities normal, atraumatic, no cyanosis or edema.   Pulses: 2+ and symmetric all extremities.   Skin: Skin color, texture, turgor normal. No rashes or lesions   Neurologic: CNII-XII intact. No gross sensory or motor deficits             Significant Diagnostic Studies:   4/23/2024: BUN 17 mg/dL (Ref range: 6 - 20 mg/dL); Calcium 9.3 mg/dL (Ref range: 8.5 - 10.1 mg/dL); Chloride 102 mmol/L (Ref range: 97 - 108 mmol/L); CO2 26 mmol/L (Ref range: 21 - 32 mmol/L); Creatinine 1.04 mg/dL (H; Ref range: 0.55 - 1.02 mg/dL); Glucose 186 mg/dL (H; Ref range: 65 - 100 mg/dL); Hematocrit 47.2 % (H; Ref range: 35.0 - 47.0 %); Hematocrit 42.8 % (Ref range: 35.0 - 47.0 %); Hemoglobin 15.3 g/dL (Ref range: 11.5 - 16.0 g/dL); Hemoglobin 14.0 g/dL (Ref range: 11.5 - 16.0 g/dL); Potassium Hemolyzed, Recollection Recommended mmol/L (Ref range: 3.5 - 5.1 mmol/L); Potassium 4.7 mmol/L (Ref range: 3.5 - 5.1 mmol/L); Sodium 132 mmol/L (L; Ref range: 136 - 145 mmol/L)  4/24/2024: BUN 11 mg/dL (Ref range: 6 - 20 mg/dL); Calcium 8.3 mg/dL (L; Ref range: 8.5 - 10.1 mg/dL); Chloride 111 mmol/L (H; Ref range: 97 - 108 mmol/L); CO2 25 mmol/L (Ref range: 21 - 32 mmol/L); Creatinine 0.74 mg/dL (Ref range: 0.55 - 1.02 mg/dL); Glucose 153 mg/dL (H; Ref range: 65 - 100 mg/dL); Hematocrit 34.0 % (L; Ref range: 35.0 - 47.0 %); Hemoglobin 11.1 g/dL (L; Ref range: 11.5 - 16.0 g/dL); Potassium 4.0 mmol/L (Ref range: 3.5 - 5.1 mmol/L); Sodium 140

## 2024-04-28 LAB
BACTERIA SPEC CULT: NORMAL
Lab: NORMAL

## 2024-04-29 LAB
BACTERIA SPEC CULT: NORMAL
BACTERIA SPEC CULT: NORMAL
Lab: NORMAL
Lab: NORMAL

## 2024-05-01 LAB
BACTERIA SPEC CULT: NORMAL
BACTERIA SPEC CULT: NORMAL
Lab: NORMAL
Lab: NORMAL

## 2024-05-03 NOTE — PROGRESS NOTES
Physician Progress Note      PATIENT:               ZAHRA TOMAS  CSN #:                  [id number]  :                       1950  ADMIT DATE:       2024 7:08 AM  DISCH DATE:        2024 1:04 PM  RESPONDING  PROVIDER #:        Levi Mcdonald MD          QUERY TEXT:    Pt admitted with Saddle PE w/ acute cor pulmonale. Pt noted to have Lactic   Acid of 4.59 on admission. If possible, please document in the progress notes   and discharge summary if you are evaluating and/or treating any of the   following:    The medical record reflects the following:  Risk Factors: PE, MI type 2, DVT.  Clinical Indicators:   Lactic acid: 4.59, 2.83, 3.5, 3.2   IR PN: \"Pulmonary angiogram demonstrated a large burden of   saddle/main/lobar PE.\"   HP: \"brought to the hospital after she started experiencing sudden onset   shortness of breath today.\"    Treatment: IR consulted, PE thrombectomy, Heparin, O2 sat monitoring, Lab   monitoring.  .  Thank you,  TITO McdonaldN, RN, CRCR, CDI Specialist  [email or web address]  Can also be reached on [employer]  Options provided:  -- Acute Lactic Acidosis  -- Metabolic Acidosis  -- Respiratory Acidosis  -- Other - I will add my own diagnosis  -- Disagree - Not applicable / Not valid  -- Disagree - Clinically unable to determine / Unknown  -- Refer to Clinical Documentation Reviewer    PROVIDER RESPONSE TEXT:    This patient has acute lactic acidosis.    Query created by: Chloe Ortega on 2024 9:06 AM      Electronically signed by:  Levi Mcdonald MD 5/3/2024 4:07 PM

## 2024-05-20 ENCOUNTER — OFFICE VISIT (OUTPATIENT)
Facility: CLINIC | Age: 74
End: 2024-05-20
Payer: COMMERCIAL

## 2024-05-20 VITALS
HEIGHT: 59 IN | BODY MASS INDEX: 31.45 KG/M2 | RESPIRATION RATE: 18 BRPM | OXYGEN SATURATION: 98 % | SYSTOLIC BLOOD PRESSURE: 143 MMHG | DIASTOLIC BLOOD PRESSURE: 68 MMHG | TEMPERATURE: 97.3 F | WEIGHT: 156 LBS | HEART RATE: 61 BPM

## 2024-05-20 DIAGNOSIS — R53.83 OTHER FATIGUE: ICD-10-CM

## 2024-05-20 DIAGNOSIS — Z13.220 LIPID SCREENING: ICD-10-CM

## 2024-05-20 DIAGNOSIS — Z86.711 HISTORY OF PULMONARY EMBOLISM: Primary | ICD-10-CM

## 2024-05-20 DIAGNOSIS — Z79.01 ON ANTICOAGULANT THERAPY: ICD-10-CM

## 2024-05-20 DIAGNOSIS — Z86.711 HISTORY OF PULMONARY EMBOLISM: ICD-10-CM

## 2024-05-20 DIAGNOSIS — R73.03 PREDIABETES: ICD-10-CM

## 2024-05-20 DIAGNOSIS — B37.2 YEAST DERMATITIS: ICD-10-CM

## 2024-05-20 PROBLEM — I26.02 ACUTE SADDLE PULMONARY EMBOLISM WITH ACUTE COR PULMONALE (HCC): Status: RESOLVED | Noted: 2024-04-23 | Resolved: 2024-05-20

## 2024-05-20 PROCEDURE — 99204 OFFICE O/P NEW MOD 45 MIN: CPT | Performed by: STUDENT IN AN ORGANIZED HEALTH CARE EDUCATION/TRAINING PROGRAM

## 2024-05-20 PROCEDURE — 1123F ACP DISCUSS/DSCN MKR DOCD: CPT | Performed by: STUDENT IN AN ORGANIZED HEALTH CARE EDUCATION/TRAINING PROGRAM

## 2024-05-20 RX ORDER — NYSTATIN 100000 [USP'U]/G
POWDER TOPICAL
Qty: 30 G | Refills: 1 | Status: SHIPPED | OUTPATIENT
Start: 2024-05-20

## 2024-05-20 SDOH — ECONOMIC STABILITY: INCOME INSECURITY: HOW HARD IS IT FOR YOU TO PAY FOR THE VERY BASICS LIKE FOOD, HOUSING, MEDICAL CARE, AND HEATING?: NOT HARD AT ALL

## 2024-05-20 SDOH — ECONOMIC STABILITY: FOOD INSECURITY: WITHIN THE PAST 12 MONTHS, THE FOOD YOU BOUGHT JUST DIDN'T LAST AND YOU DIDN'T HAVE MONEY TO GET MORE.: NEVER TRUE

## 2024-05-20 SDOH — ECONOMIC STABILITY: HOUSING INSECURITY
IN THE LAST 12 MONTHS, WAS THERE A TIME WHEN YOU DID NOT HAVE A STEADY PLACE TO SLEEP OR SLEPT IN A SHELTER (INCLUDING NOW)?: NO

## 2024-05-20 SDOH — ECONOMIC STABILITY: FOOD INSECURITY: WITHIN THE PAST 12 MONTHS, YOU WORRIED THAT YOUR FOOD WOULD RUN OUT BEFORE YOU GOT MONEY TO BUY MORE.: NEVER TRUE

## 2024-05-20 ASSESSMENT — ANXIETY QUESTIONNAIRES
3. WORRYING TOO MUCH ABOUT DIFFERENT THINGS: NOT AT ALL
2. NOT BEING ABLE TO STOP OR CONTROL WORRYING: NOT AT ALL
GAD7 TOTAL SCORE: 0
IF YOU CHECKED OFF ANY PROBLEMS ON THIS QUESTIONNAIRE, HOW DIFFICULT HAVE THESE PROBLEMS MADE IT FOR YOU TO DO YOUR WORK, TAKE CARE OF THINGS AT HOME, OR GET ALONG WITH OTHER PEOPLE: NOT DIFFICULT AT ALL
7. FEELING AFRAID AS IF SOMETHING AWFUL MIGHT HAPPEN: NOT AT ALL
1. FEELING NERVOUS, ANXIOUS, OR ON EDGE: NOT AT ALL
6. BECOMING EASILY ANNOYED OR IRRITABLE: NOT AT ALL
5. BEING SO RESTLESS THAT IT IS HARD TO SIT STILL: NOT AT ALL
4. TROUBLE RELAXING: NOT AT ALL

## 2024-05-20 ASSESSMENT — PATIENT HEALTH QUESTIONNAIRE - PHQ9
1. LITTLE INTEREST OR PLEASURE IN DOING THINGS: NOT AT ALL
SUM OF ALL RESPONSES TO PHQ QUESTIONS 1-9: 0
SUM OF ALL RESPONSES TO PHQ9 QUESTIONS 1 & 2: 0
2. FEELING DOWN, DEPRESSED OR HOPELESS: NOT AT ALL

## 2024-05-20 NOTE — PROGRESS NOTES
\"Have you been to the ER, urgent care clinic since your last visit?  Hospitalized since your last visit?\"    Yes, [facility]    “Have you seen or consulted any other health care providers outside of [facility] since your last visit?”    NO    Have you had a mammogram?”   No        “Have you had a colorectal cancer screening such as a colonoscopy/FIT/Cologuard?    No      Chief Complaint   Patient presents with    Establish Care    Follow-Up from Hospital    Fatigue     BP (!) 143/68 (Site: Right Upper Arm, Position: Sitting, Cuff Size: Small Adult)   Pulse 61   Temp 97.3 °F (36.3 °C) (Temporal)   Resp 18   Ht 1.499 m (4' 11\")   Wt 70.8 kg (156 lb)   SpO2 98%   BMI 31.51 kg/m²

## 2024-05-20 NOTE — PROGRESS NOTES
[facility]  Subjective       Chief Complaint   Patient presents with    Establish Care    Follow-Up from Hospital    Fatigue     HPI:  Emmy Forde is a 74 y.o. female.    NEW TO PROVIDER  [facility]  RECENT HOSPITAL DISCHARGE    Admit date: 4/23/2024  Discharge date : 4/25/2024  Final Diagnoses:   Acute deep vein thrombosis (DVT) of proximal vein of left lower extremity (HCC) [I82.4Y2]  Acute saddle pulmonary embolism with acute cor pulmonale (HCC) [I26.02]  Acute saddle pulmonary embolism, unspecified whether acute cor pulmonale present (HCC) [I26.92]  Extensive DVT left lower extremity  Status post pulmonary artery thrombectomy bilateral    Occupation: [employer]- works at [employer]    This was the first time she had a DVT or PE  Since discharge she has been feeling fatigued.  Says she normally feels like doing things after work but lately she has not  Says just feeling tired denies any shortness of breath feeling winded  Going to bed at same time and waking up same time  Only taking eliquis  No other new medications  She does take supplements as below  Denies dark or bloody stools    Says she used to be prediabetes  Says she does not like to do medications for prediabetes  She did have elevated sugars in the hospital    Has had some intermittent LE edema bilateral improved with propping legs up  Has compression socks    DIET:  Does yogurt x 2  Whole wheat crackers with cheese  Says sometimes chicken salad with protein or does eat leftovers from dinner before  Says her biggest problem is that she likes chocolate  Says her biggest problem is Diet dr Pérez- on a week day is one glass a day  Drinks water at work 16 oz    EXERCISE: active lifestyle, playing with grandkids  Son and daughter in law live with her    UNDER BREAST RASH  Some itchiness  Moist issues  Uses towel to keep dry  Only wears bra at work  Tried lamisil but made moist    Supplements take   VITAMIN d3  Calcium  Magnesium and

## 2024-08-12 ENCOUNTER — TELEPHONE (OUTPATIENT)
Facility: CLINIC | Age: 74
End: 2024-08-12

## 2024-08-12 NOTE — TELEPHONE ENCOUNTER
----- Message from Zeeshan CHUNG sent at 8/12/2024 12:27 PM EDT -----  Regarding: [facility] Appointment Request  [facility] Appointment Request    Patient needs appointment for [facility] Appointment Type: Existing Condition Follow Up.    Patient Requested Dates(s): Next week Monday   Patient Requested Time: Anytime   Provider Name:Teri Herrera DO    Reason for Appointment Request: Established Patient - Available appointments did not meet patient need // pt wanted to be seen early and have a medication discussion to the provider   --------------------------------------------------------------------------------------------------------------------------    Relationship to Patient: Self     Call Back Information: OK to leave message on Loop App  Preferred Call Back Number: Phone [phone]

## 2024-08-27 LAB
ALBUMIN SERPL-MCNC: 4.2 G/DL (ref 3.8–4.8)
ALP SERPL-CCNC: 79 IU/L (ref 44–121)
ALT SERPL-CCNC: 27 IU/L (ref 0–32)
AST SERPL-CCNC: 22 IU/L (ref 0–40)
BASOPHILS # BLD AUTO: 0 X10E3/UL (ref 0–0.2)
BASOPHILS NFR BLD AUTO: 1 %
BILIRUB SERPL-MCNC: 0.3 MG/DL (ref 0–1.2)
BUN SERPL-MCNC: 21 MG/DL (ref 8–27)
BUN/CREAT SERPL: 21 (ref 12–28)
CALCIUM SERPL-MCNC: 9.1 MG/DL (ref 8.7–10.3)
CHLORIDE SERPL-SCNC: 101 MMOL/L (ref 96–106)
CHOLEST SERPL-MCNC: 334 MG/DL (ref 100–199)
CO2 SERPL-SCNC: 24 MMOL/L (ref 20–29)
CREAT SERPL-MCNC: 0.99 MG/DL (ref 0.57–1)
EGFRCR SERPLBLD CKD-EPI 2021: 60 ML/MIN/1.73
EOSINOPHIL # BLD AUTO: 0.3 X10E3/UL (ref 0–0.4)
EOSINOPHIL NFR BLD AUTO: 4 %
ERYTHROCYTE [DISTWIDTH] IN BLOOD BY AUTOMATED COUNT: 13.1 % (ref 11.7–15.4)
GLOBULIN SER CALC-MCNC: 2.7 G/DL (ref 1.5–4.5)
GLUCOSE SERPL-MCNC: 119 MG/DL (ref 70–99)
HBA1C MFR BLD: 7.5 % (ref 4.8–5.6)
HCT VFR BLD AUTO: 43.5 % (ref 34–46.6)
HDLC SERPL-MCNC: 50 MG/DL
HGB BLD-MCNC: 13.9 G/DL (ref 11.1–15.9)
IMM GRANULOCYTES # BLD AUTO: 0 X10E3/UL (ref 0–0.1)
IMM GRANULOCYTES NFR BLD AUTO: 0 %
LDLC SERPL CALC-MCNC: 229 MG/DL (ref 0–99)
LYMPHOCYTES # BLD AUTO: 3.6 X10E3/UL (ref 0.7–3.1)
LYMPHOCYTES NFR BLD AUTO: 43 %
Lab: ABNORMAL
MCH RBC QN AUTO: 29.1 PG (ref 26.6–33)
MCHC RBC AUTO-ENTMCNC: 32 G/DL (ref 31.5–35.7)
MCV RBC AUTO: 91 FL (ref 79–97)
MONOCYTES # BLD AUTO: 0.8 X10E3/UL (ref 0.1–0.9)
MONOCYTES NFR BLD AUTO: 10 %
NEUTROPHILS # BLD AUTO: 3.4 X10E3/UL (ref 1.4–7)
NEUTROPHILS NFR BLD AUTO: 42 %
PLATELET # BLD AUTO: 281 X10E3/UL (ref 150–450)
POTASSIUM SERPL-SCNC: 4.4 MMOL/L (ref 3.5–5.2)
PROT SERPL-MCNC: 6.9 G/DL (ref 6–8.5)
RBC # BLD AUTO: 4.77 X10E6/UL (ref 3.77–5.28)
SODIUM SERPL-SCNC: 138 MMOL/L (ref 134–144)
TRIGL SERPL-MCNC: 266 MG/DL (ref 0–149)
VLDLC SERPL CALC-MCNC: 55 MG/DL (ref 5–40)
WBC # BLD AUTO: 8.1 X10E3/UL (ref 3.4–10.8)

## 2024-08-29 ENCOUNTER — OFFICE VISIT (OUTPATIENT)
Facility: CLINIC | Age: 74
End: 2024-08-29
Payer: COMMERCIAL

## 2024-08-29 VITALS
HEART RATE: 70 BPM | WEIGHT: 153 LBS | RESPIRATION RATE: 18 BRPM | BODY MASS INDEX: 30.84 KG/M2 | TEMPERATURE: 97.9 F | OXYGEN SATURATION: 97 % | DIASTOLIC BLOOD PRESSURE: 65 MMHG | SYSTOLIC BLOOD PRESSURE: 147 MMHG | HEIGHT: 59 IN

## 2024-08-29 DIAGNOSIS — Z86.711 HISTORY OF PULMONARY EMBOLISM: ICD-10-CM

## 2024-08-29 DIAGNOSIS — E78.2 MIXED HYPERLIPIDEMIA: Primary | ICD-10-CM

## 2024-08-29 DIAGNOSIS — R21 RASH: ICD-10-CM

## 2024-08-29 DIAGNOSIS — Z79.01 ON ANTICOAGULANT THERAPY: ICD-10-CM

## 2024-08-29 DIAGNOSIS — B37.89 CANDIDIASIS OF BREAST: ICD-10-CM

## 2024-08-29 DIAGNOSIS — R73.03 PREDIABETES: ICD-10-CM

## 2024-08-29 PROCEDURE — 1123F ACP DISCUSS/DSCN MKR DOCD: CPT | Performed by: STUDENT IN AN ORGANIZED HEALTH CARE EDUCATION/TRAINING PROGRAM

## 2024-08-29 PROCEDURE — 99214 OFFICE O/P EST MOD 30 MIN: CPT | Performed by: STUDENT IN AN ORGANIZED HEALTH CARE EDUCATION/TRAINING PROGRAM

## 2024-08-29 RX ORDER — ATORVASTATIN CALCIUM 10 MG/1
10 TABLET, FILM COATED ORAL DAILY
Qty: 30 TABLET | Refills: 1 | Status: SHIPPED | OUTPATIENT
Start: 2024-08-29

## 2024-08-29 RX ORDER — TRIAMCINOLONE ACETONIDE 1 MG/G
OINTMENT TOPICAL 2 TIMES DAILY
Qty: 15 G | Refills: 0 | Status: SHIPPED | OUTPATIENT
Start: 2024-08-29 | End: 2024-09-05

## 2024-08-29 ASSESSMENT — PATIENT HEALTH QUESTIONNAIRE - PHQ9
SUM OF ALL RESPONSES TO PHQ9 QUESTIONS 1 & 2: 0
SUM OF ALL RESPONSES TO PHQ QUESTIONS 1-9: 0
2. FEELING DOWN, DEPRESSED OR HOPELESS: NOT AT ALL
1. LITTLE INTEREST OR PLEASURE IN DOING THINGS: NOT AT ALL
SUM OF ALL RESPONSES TO PHQ QUESTIONS 1-9: 0

## 2024-08-29 ASSESSMENT — ANXIETY QUESTIONNAIRES
2. NOT BEING ABLE TO STOP OR CONTROL WORRYING: NOT AT ALL
1. FEELING NERVOUS, ANXIOUS, OR ON EDGE: NOT AT ALL
GAD7 TOTAL SCORE: 0
3. WORRYING TOO MUCH ABOUT DIFFERENT THINGS: NOT AT ALL
IF YOU CHECKED OFF ANY PROBLEMS ON THIS QUESTIONNAIRE, HOW DIFFICULT HAVE THESE PROBLEMS MADE IT FOR YOU TO DO YOUR WORK, TAKE CARE OF THINGS AT HOME, OR GET ALONG WITH OTHER PEOPLE: NOT DIFFICULT AT ALL
4. TROUBLE RELAXING: NOT AT ALL
6. BECOMING EASILY ANNOYED OR IRRITABLE: NOT AT ALL
5. BEING SO RESTLESS THAT IT IS HARD TO SIT STILL: NOT AT ALL
7. FEELING AFRAID AS IF SOMETHING AWFUL MIGHT HAPPEN: NOT AT ALL

## 2024-08-29 NOTE — PROGRESS NOTES
\"Have you been to the ER, urgent care clinic since your last visit?  Hospitalized since your last visit?\"    NO    “Have you seen or consulted any other health care providers outside of [facility] since your last visit?”    NO    Have you had a mammogram?”   Yes , Record request E-faxed to [facility]        “Have you had a colorectal cancer screening such as a colonoscopy/FIT/Cologuard?    Yes, Record request E-faxed to [facility]       Chief Complaint   Patient presents with    3 Month Follow-Up    Medication Refill    Rash     Under left breast       dry skin behind both ears     BP (!) 147/65 (Site: Right Upper Arm, Position: Sitting, Cuff Size: Large Adult)   Pulse 70   Temp 97.9 °F (36.6 °C) (Temporal)   Resp 18   Ht 1.499 m (4' 11\")   Wt 69.4 kg (153 lb)   SpO2 97%   BMI 30.90 kg/m²         Click Here for Release of Records Request

## 2024-08-29 NOTE — PROGRESS NOTES
[facility]  Subjective       Chief Complaint   Patient presents with    3 Month Follow-Up    Medication Refill    Rash     Under left breast       dry skin behind both ears     HPI:  Emmy Forde is a 74 y.o. female here for follow up from [facility]    History of Unprovoked PULMONARY EMBOLISM  On eliquis but she is only taking 5 mg once daily  Estimated Creatinine Clearance: 45 mL/min (based on SCr of 0.99 mg/dL).  74 y.o.  Weight 69.4 kg   Lab Results   Component Value Date    CREATININE 0.99 08/26/2024     Denies any bleeding issues or dark stools    ELEVATED A1c x 1   Hemoglobin A1C   Date Value Ref Range Status   08/26/2024 7.5 (H) 4.8 - 5.6 % Final     Comment:                 Prediabetes: 5.7 - 6.4           Diabetes: >6.4           Glycemic control for adults with diabetes: <7.0       DIET:  Banana/yogurt/crackers and cheese  eggs  Says \"I don't do sugars that much\"   Drinks diet sodas if drinking sodas  Chocolate acai berries  Sometimes pineapple juice or V8 juice  Says she likes vegetables    Exercise:   goal of half hour walk, riding bikes with grandkids    Says her goal weight is 140 lb    FH of diabetes    HLD  Significant   Not on statin  Never has been on statin    RASH UNDER BREASTS  Tried nystatin powder and did not improve  Says this is new in last year  Did get butt paste and has been helping    RASH BEHIND EARS  Dry and painful  Denies history of eczema  Says she had to just get a new repiercing in March   Says the dryness behind ears was even longer than march    Past Medical History:   Diagnosis Date    Acute saddle pulmonary embolism with acute cor pulmonale ([facility]) 04/23/2024    DVT (deep venous thrombosis) ([facility])     Pulmonary embolism ([facility])      Current Outpatient Medications   Medication Sig Dispense Refill    triamcinolone (KENALOG) 0.1 % ointment Apply topically 2 times daily for 7 days 15 g 0    apixaban (ELIQUIS) 5 MG TABS tablet Take 1 tablet by mouth 2 times daily  that it is recommended to be on lifelong therapy  She has not had any issues with bleeding.  We discussed possible risk factors of continued Eliquis.    Discussed that given her recent labs which we reviewed together in person we should consider doing a cholesterol-lowering medication.  She was agreeable.  We discussed possible side effects.    Discussed with patient that her A1c was unfortunately in a diabetic range at 7.5%.  Discussed with her that I will not make diagnosis of diabetes quite at this time want to see what the repeat test looks like.  Discussed with her if still elevated 6.5 or above we will have to diagnose her with diabetes.  We did discuss lifestyle modifications to work on to reduce sugars including exercise and diet recs.    Did discuss with patient that again her blood pressure is elevated.  Discussed goal less than 140 over less than 90  Discussed with her that given this is the first time she has ever had high pressures I want her to keep a blood pressure log, get a blood pressure cuff and come back for nurses visit to compare the cuffs.  She was agreeable.    Discussed with her that I believe she is having increased issues with candidiasis given her sugar levels.  Discussed the combination of barrier cream with zinc oxide she has been using as well as the nystatin powder.    Discussed with patient that sometimes rash behind the ear can be partially a contact dermatitis from the earrings.  She reports a new piercing.  Discussed with her doing a short course of steroid cream for no longer than 2 weeks.  Continuing barrier creams and moisturizers.    If rash is not improving then we should look at getting her into dermatology.      Aspects of this note have been generated using voice recognition software. Despite editing, there may be some syntax errors.  Follow-up and Dispositions    Return in about 3 months (around 11/29/2024) for a1c elevation recheck, high BP, statin.       Teri YEUNG

## 2024-09-26 ENCOUNTER — LAB (OUTPATIENT)
Facility: CLINIC | Age: 74
End: 2024-09-26

## 2024-09-26 VITALS — DIASTOLIC BLOOD PRESSURE: 67 MMHG | SYSTOLIC BLOOD PRESSURE: 145 MMHG

## 2024-12-06 ENCOUNTER — OFFICE VISIT (OUTPATIENT)
Facility: CLINIC | Age: 74
End: 2024-12-06
Payer: COMMERCIAL

## 2024-12-06 VITALS
DIASTOLIC BLOOD PRESSURE: 59 MMHG | HEART RATE: 54 BPM | HEIGHT: 59 IN | BODY MASS INDEX: 30.84 KG/M2 | OXYGEN SATURATION: 96 % | WEIGHT: 153 LBS | RESPIRATION RATE: 18 BRPM | SYSTOLIC BLOOD PRESSURE: 145 MMHG | TEMPERATURE: 97 F

## 2024-12-06 DIAGNOSIS — Z79.4 TYPE 2 DIABETES MELLITUS WITHOUT COMPLICATION, WITH LONG-TERM CURRENT USE OF INSULIN (HCC): ICD-10-CM

## 2024-12-06 DIAGNOSIS — E11.9 TYPE 2 DIABETES MELLITUS WITHOUT COMPLICATION, WITH LONG-TERM CURRENT USE OF INSULIN (HCC): Primary | ICD-10-CM

## 2024-12-06 DIAGNOSIS — E11.9 TYPE 2 DIABETES MELLITUS WITHOUT COMPLICATION, WITH LONG-TERM CURRENT USE OF INSULIN (HCC): ICD-10-CM

## 2024-12-06 DIAGNOSIS — Z86.711 HISTORY OF PULMONARY EMBOLISM: ICD-10-CM

## 2024-12-06 DIAGNOSIS — E11.9 TYPE 2 DIABETES MELLITUS WITHOUT COMPLICATION, WITHOUT LONG-TERM CURRENT USE OF INSULIN (HCC): ICD-10-CM

## 2024-12-06 DIAGNOSIS — Z86.718 HISTORY OF DVT (DEEP VEIN THROMBOSIS): ICD-10-CM

## 2024-12-06 DIAGNOSIS — Z79.4 TYPE 2 DIABETES MELLITUS WITHOUT COMPLICATION, WITH LONG-TERM CURRENT USE OF INSULIN (HCC): Primary | ICD-10-CM

## 2024-12-06 DIAGNOSIS — E78.2 MIXED HYPERLIPIDEMIA: ICD-10-CM

## 2024-12-06 DIAGNOSIS — Z12.11 COLON CANCER SCREENING: ICD-10-CM

## 2024-12-06 DIAGNOSIS — R03.0 ELEVATED BLOOD PRESSURE READING: ICD-10-CM

## 2024-12-06 DIAGNOSIS — L85.3 DRY SKIN: ICD-10-CM

## 2024-12-06 PROBLEM — R73.03 PREDIABETES: Status: RESOLVED | Noted: 2024-05-20 | Resolved: 2024-12-06

## 2024-12-06 LAB — HBA1C MFR BLD: 6.7 %

## 2024-12-06 PROCEDURE — 99214 OFFICE O/P EST MOD 30 MIN: CPT | Performed by: STUDENT IN AN ORGANIZED HEALTH CARE EDUCATION/TRAINING PROGRAM

## 2024-12-06 PROCEDURE — 83036 HEMOGLOBIN GLYCOSYLATED A1C: CPT | Performed by: STUDENT IN AN ORGANIZED HEALTH CARE EDUCATION/TRAINING PROGRAM

## 2024-12-06 PROCEDURE — 3051F HG A1C>EQUAL 7.0%<8.0%: CPT | Performed by: STUDENT IN AN ORGANIZED HEALTH CARE EDUCATION/TRAINING PROGRAM

## 2024-12-06 PROCEDURE — 1123F ACP DISCUSS/DSCN MKR DOCD: CPT | Performed by: STUDENT IN AN ORGANIZED HEALTH CARE EDUCATION/TRAINING PROGRAM

## 2024-12-06 RX ORDER — ATORVASTATIN CALCIUM 10 MG/1
10 TABLET, FILM COATED ORAL DAILY
Qty: 90 TABLET | Refills: 1 | Status: SHIPPED | OUTPATIENT
Start: 2024-12-06

## 2024-12-06 SDOH — ECONOMIC STABILITY: FOOD INSECURITY: WITHIN THE PAST 12 MONTHS, THE FOOD YOU BOUGHT JUST DIDN'T LAST AND YOU DIDN'T HAVE MONEY TO GET MORE.: NEVER TRUE

## 2024-12-06 SDOH — ECONOMIC STABILITY: FOOD INSECURITY: WITHIN THE PAST 12 MONTHS, YOU WORRIED THAT YOUR FOOD WOULD RUN OUT BEFORE YOU GOT MONEY TO BUY MORE.: NEVER TRUE

## 2024-12-06 SDOH — ECONOMIC STABILITY: INCOME INSECURITY: HOW HARD IS IT FOR YOU TO PAY FOR THE VERY BASICS LIKE FOOD, HOUSING, MEDICAL CARE, AND HEATING?: NOT HARD AT ALL

## 2024-12-06 ASSESSMENT — PATIENT HEALTH QUESTIONNAIRE - PHQ9
SUM OF ALL RESPONSES TO PHQ QUESTIONS 1-9: 0
1. LITTLE INTEREST OR PLEASURE IN DOING THINGS: NOT AT ALL
SUM OF ALL RESPONSES TO PHQ9 QUESTIONS 1 & 2: 0
2. FEELING DOWN, DEPRESSED OR HOPELESS: NOT AT ALL

## 2024-12-06 NOTE — PROGRESS NOTES
\"Have you been to the ER, urgent care clinic since your last visit?  Hospitalized since your last visit?\"    NO    “Have you seen or consulted any other health care providers outside our system since your last visit?”    NO    Have you had a mammogram?”   NO    Date of last Mammogram: 9/8/2022       “Have you had a colorectal cancer screening such as a colonoscopy/FIT/Cologuard?    NO    No colonoscopy on file  No cologuard on file  No FIT/FOBT on file   No flexible sigmoidoscopy on file          Chief Complaint   Patient presents with    Hypertension    a1c check     BP (!) 145/59 (Site: Left Upper Arm, Position: Sitting, Cuff Size: Large Adult)   Pulse 54   Temp 97 °F (36.1 °C) (Temporal)   Resp 18   Ht 1.499 m (4' 11\")   Wt 69.4 kg (153 lb)   SpO2 96%   BMI 30.90 kg/m²     
record the readings, and bring her machine to the next visit for calibration. Not currently on antihypertensives  Needs microalb/cr- consider low dose ACE/ARB    3. Hyperlipidemia.  A refill for her cholesterol medication will be provided.    4. Anticoagulation Management. Hx DVT/PE unprovoked  Her Eliquis dosage will be increased to 5 mg twice daily, and she was instructed to report any bleeding issues.  Discussed again proper dose of BID  Discussed pros/cons of continued therapy- unprovoked    5. Rash behind the ear.  The rash behind her ear has improved but remains dry. She was advised to continue using lotions behind her ear.    6. Health Maintenance.  A mammogram and bone density scan were recommended but deferred for now. A Cologuard test will be ordered for colon cancer screening. She was advised to receive her second pneumonia booster at a pharmacy.    Follow-up  Return in 3 months for follow up.    Aspects of this note have been generated using voice recognition software. Despite editing, there may be some syntax errors.    The patient (or guardian, if applicable) and other individuals in attendance with the patient were advised that Artificial Intelligence will be utilized during this visit to record, process the conversation to generate a clinical note, and support improvement of the AI technology. The patient (or guardian, if applicable) and other individuals in attendance at the appointment consented to the use of AI, including the recording.      Follow-up and Dispositions    Return in about 3 months (around 3/6/2025) for DM2, HTN.       Teri Herrera DO

## 2024-12-06 NOTE — PATIENT INSTRUCTIONS
[employer] is the website for information    Your blood pressure readings should be less than 140 over less than 90 (<140/<90)    Bring your blood pressure cuff to the next visit    Bring your blood pressure Log (list of readings) to the next visit    This is the correct way to measure your blood pressure  Learn the correct way to have your blood pressure taken, whether you're getting it checked at the doctor's office or checking it yourself at home. Use this checklist:  Don't eat or drink anything 30 minutes before you take your blood pressure.  Empty your bladder before your reading.  Sit in a comfortable chair with your back supported for at least 5 minutes before your reading.  Put both feet flat on the ground and keep your legs uncrossed.  Rest your arm with the cuff on a table at chest height.  Make sure the blood pressure cuff is snug but not too tight. The cuff should be against your bare skin, not over clothing.  Do not talk while your blood pressure is being measured.     additional location

## 2024-12-09 LAB
ALBUMIN/CREAT UR: 5 MG/G CREAT (ref 0–29)
CREAT UR-MCNC: 113.2 MG/DL
MICROALBUMIN UR-MCNC: 5.1 UG/ML

## 2025-01-11 LAB — NONINV COLON CA DNA+OCC BLD SCRN STL QL: NEGATIVE

## 2025-01-14 ENCOUNTER — TELEPHONE (OUTPATIENT)
Facility: CLINIC | Age: 75
End: 2025-01-14

## 2025-01-14 NOTE — TELEPHONE ENCOUNTER
----- Message from Dr. Teri Herrera, DO sent at 1/14/2025 10:37 AM EST -----  Patient not active on mychart.  Can we please call with results:  aNno was negative

## 2025-01-21 NOTE — TELEPHONE ENCOUNTER
Patient called and stated she would like to get the results of her colorguard.  Please call her at [phone].

## 2025-03-07 ENCOUNTER — OFFICE VISIT (OUTPATIENT)
Facility: CLINIC | Age: 75
End: 2025-03-07

## 2025-03-07 VITALS
TEMPERATURE: 98 F | OXYGEN SATURATION: 96 % | WEIGHT: 156 LBS | DIASTOLIC BLOOD PRESSURE: 65 MMHG | HEIGHT: 59 IN | SYSTOLIC BLOOD PRESSURE: 125 MMHG | RESPIRATION RATE: 18 BRPM | HEART RATE: 51 BPM | BODY MASS INDEX: 31.45 KG/M2

## 2025-03-07 DIAGNOSIS — Z86.718 HISTORY OF DVT (DEEP VEIN THROMBOSIS): ICD-10-CM

## 2025-03-07 DIAGNOSIS — Z00.01 ENCOUNTER FOR WELL ADULT EXAM WITH ABNORMAL FINDINGS: Primary | ICD-10-CM

## 2025-03-07 DIAGNOSIS — E78.2 MIXED HYPERLIPIDEMIA: ICD-10-CM

## 2025-03-07 DIAGNOSIS — E11.9 TYPE 2 DIABETES MELLITUS WITHOUT COMPLICATION, WITHOUT LONG-TERM CURRENT USE OF INSULIN (HCC): ICD-10-CM

## 2025-03-07 DIAGNOSIS — Z86.711 HISTORY OF PULMONARY EMBOLISM: ICD-10-CM

## 2025-03-07 LAB — HBA1C MFR BLD: 7.3 %

## 2025-03-07 RX ORDER — ATORVASTATIN CALCIUM 10 MG/1
10 TABLET, FILM COATED ORAL DAILY
Qty: 90 TABLET | Refills: 3 | Status: SHIPPED | OUTPATIENT
Start: 2025-03-07

## 2025-03-07 SDOH — ECONOMIC STABILITY: FOOD INSECURITY: WITHIN THE PAST 12 MONTHS, YOU WORRIED THAT YOUR FOOD WOULD RUN OUT BEFORE YOU GOT MONEY TO BUY MORE.: NEVER TRUE

## 2025-03-07 SDOH — ECONOMIC STABILITY: FOOD INSECURITY: WITHIN THE PAST 12 MONTHS, THE FOOD YOU BOUGHT JUST DIDN'T LAST AND YOU DIDN'T HAVE MONEY TO GET MORE.: NEVER TRUE

## 2025-03-07 ASSESSMENT — PATIENT HEALTH QUESTIONNAIRE - PHQ9
SUM OF ALL RESPONSES TO PHQ QUESTIONS 1-9: 0
1. LITTLE INTEREST OR PLEASURE IN DOING THINGS: NOT AT ALL
SUM OF ALL RESPONSES TO PHQ QUESTIONS 1-9: 0
2. FEELING DOWN, DEPRESSED OR HOPELESS: NOT AT ALL

## 2025-03-07 NOTE — PROGRESS NOTES
\"Have you been to the ER, urgent care clinic since your last visit?  Hospitalized since your last visit?\"    NO    “Have you seen or consulted any other health care providers outside our system since your last visit?”    NO    Have you had a mammogram?”   NO    Date of last Mammogram: 9/8/2022       “Have you had a diabetic eye exam?”    NO     No diabetic eye exam on file     Chief Complaint   Patient presents with    Annual Exam     /65 (Site: Left Upper Arm, Position: Sitting, Cuff Size: Large Adult)   Pulse 51   Temp 98 °F (36.7 °C) (Temporal)   Resp 18   Ht 1.499 m (4' 11\")   Wt 70.8 kg (156 lb)   SpO2 96%   BMI 31.51 kg/m²            
10/15/2024    Pneumococcal, PPSV23, PNEUMOVAX 23, (age 2y+), SC/IM, 0.5mL 10/01/2023    TDaP, ADACEL (age 10y-64y), BOOSTRIX (age 10y+), IM, 0.5mL 02/15/2023        Health Maintenance Due   Topic Date Due    Diabetic retinal exam  Never done    Hepatitis C screen  Never done    DEXA (modify frequency per FRAX score)  Never done    Breast cancer screen  09/08/2024    Pneumococcal 50+ years Vaccine (2 of 2 - PCV) 10/01/2024    COVID-19 Vaccine (8 - 2024-25 season) 01/01/2025      Recommendations for Preventive Services Due: see orders and patient instructions/AVS.    The patient (or guardian, if applicable) and other individuals in attendance with the patient were advised that Artificial Intelligence will be utilized during this visit to record and process the conversation to generate a clinical note. The patient (or guardian, if applicable) and other individuals in attendance at the appointment consented to the use of AI, including the recording.

## 2025-03-07 NOTE — PATIENT INSTRUCTIONS
counselor, talk to your doctor.   Talk to your doctor if you think you may have a problem with alcohol or drug use. This includes prescription medicines and illegal drugs.     Avoid tobacco and nicotine: Don't smoke, vape, or chew. If you need help quitting, talk to your doctor.   Practice safer sex. Getting tested, using condoms or dental dams, and limiting sex partners can help prevent STIs.     Make an advance directive. This is a legal way to tell your family and doctor what you want to happen at the end of your life or when you can't speak for yourself.   Prevent problems where you can. Protect your skin from too much sun, wash your hands, brush your teeth twice a day, and wear a seat belt in the car.   Where can you learn more?  Go to https://www.[employer].net/patientEd and enter K859 to learn more about \"Well Visit, Over 65: Care Instructions.\"  Current as of: April 30, 2024  Content Version: 14.3  © 2024 [employer].   Care instructions adapted under license by [employer]. If you have questions about a medical condition or this instruction, always ask your healthcare professional. [employer], disclaims any warranty or liability for your use of this information.

## 2025-07-28 NOTE — CARE COORDINATION
CM noted discharge order.  Patient has no CM needs at discharge.    Transition of Care Plan:    RUR: 9%  Prior Level of Functioning: Independent  Disposition: Home/self  If SNF or IPR: Date FOC offered: N/A  Date FOC received: N/A  Accepting facility: N/A  Date authorization started with reference number: N/A  Date authorization received and expires: N/A  Follow up appointments: Per MD  DME needed: N/A  Transportation at discharge: Patient arranged  IM/IMM Medicare/ letter given: N/A  Is patient a [place] and connected with [facility]? N/A   If yes, was [place] transfer form completed and [facility] notified?   Caregiver Contact: N/A  Discharge Caregiver contacted prior to discharge? N/A  Care Conference needed? No  Barriers to discharge: None     Ambulatory